# Patient Record
Sex: FEMALE | Race: WHITE | NOT HISPANIC OR LATINO
[De-identification: names, ages, dates, MRNs, and addresses within clinical notes are randomized per-mention and may not be internally consistent; named-entity substitution may affect disease eponyms.]

---

## 2017-03-07 ENCOUNTER — RX RENEWAL (OUTPATIENT)
Age: 77
End: 2017-03-07

## 2017-06-01 ENCOUNTER — APPOINTMENT (OUTPATIENT)
Dept: HEMATOLOGY ONCOLOGY | Facility: CLINIC | Age: 77
End: 2017-06-01

## 2017-06-01 VITALS
WEIGHT: 155 LBS | HEART RATE: 64 BPM | BODY MASS INDEX: 26.46 KG/M2 | TEMPERATURE: 98.4 F | DIASTOLIC BLOOD PRESSURE: 50 MMHG | SYSTOLIC BLOOD PRESSURE: 118 MMHG

## 2017-09-08 ENCOUNTER — RX RENEWAL (OUTPATIENT)
Age: 77
End: 2017-09-08

## 2017-12-01 LAB
BASOPHILS # BLD AUTO: 0.08 K/UL
BASOPHILS NFR BLD AUTO: 1 %
EOSINOPHIL # BLD AUTO: 0.2 K/UL
EOSINOPHIL NFR BLD AUTO: 2.5 %
HCT VFR BLD CALC: 37.7 %
HGB BLD-MCNC: 12.1 G/DL
IMM GRANULOCYTES NFR BLD AUTO: 0.3 %
LYMPHOCYTES # BLD AUTO: 1.53 K/UL
LYMPHOCYTES NFR BLD AUTO: 19.4 %
MAN DIFF?: NORMAL
MCHC RBC-ENTMCNC: 29.3 PG
MCHC RBC-ENTMCNC: 32.1 GM/DL
MCV RBC AUTO: 91.3 FL
MONOCYTES # BLD AUTO: 0.58 K/UL
MONOCYTES NFR BLD AUTO: 7.4 %
NEUTROPHILS # BLD AUTO: 5.47 K/UL
NEUTROPHILS NFR BLD AUTO: 69.4 %
PLATELET # BLD AUTO: 379 K/UL
RBC # BLD: 4.13 M/UL
RBC # FLD: 13.2 %
WBC # FLD AUTO: 7.88 K/UL

## 2017-12-02 LAB
ALBUMIN SERPL ELPH-MCNC: 4 G/DL
ALP BLD-CCNC: 76 U/L
ALT SERPL-CCNC: 4 U/L
ANION GAP SERPL CALC-SCNC: 15 MMOL/L
AST SERPL-CCNC: 21 U/L
BILIRUB SERPL-MCNC: 0.2 MG/DL
BUN SERPL-MCNC: 22 MG/DL
CALCIUM SERPL-MCNC: 10.1 MG/DL
CHLORIDE SERPL-SCNC: 106 MMOL/L
CO2 SERPL-SCNC: 23 MMOL/L
CREAT SERPL-MCNC: 1.21 MG/DL
GLUCOSE SERPL-MCNC: 111 MG/DL
POTASSIUM SERPL-SCNC: 4.6 MMOL/L
PROT SERPL-MCNC: 7 G/DL
SODIUM SERPL-SCNC: 144 MMOL/L

## 2017-12-11 ENCOUNTER — RECORD ABSTRACTING (OUTPATIENT)
Age: 77
End: 2017-12-11

## 2017-12-14 ENCOUNTER — APPOINTMENT (OUTPATIENT)
Dept: HEMATOLOGY ONCOLOGY | Facility: CLINIC | Age: 77
End: 2017-12-14
Payer: MEDICARE

## 2017-12-14 VITALS
BODY MASS INDEX: 26.46 KG/M2 | DIASTOLIC BLOOD PRESSURE: 56 MMHG | TEMPERATURE: 98.5 F | HEART RATE: 64 BPM | WEIGHT: 155 LBS | SYSTOLIC BLOOD PRESSURE: 106 MMHG

## 2017-12-14 DIAGNOSIS — Z00.00 ENCOUNTER FOR GENERAL ADULT MEDICAL EXAMINATION W/OUT ABNORMAL FINDINGS: ICD-10-CM

## 2017-12-14 DIAGNOSIS — Z86.73 PERSONAL HISTORY OF TRANSIENT ISCHEMIC ATTACK (TIA), AND CEREBRAL INFARCTION W/OUT RESIDUAL DEFICITS: ICD-10-CM

## 2017-12-14 PROCEDURE — 99214 OFFICE O/P EST MOD 30 MIN: CPT

## 2018-02-28 ENCOUNTER — RX RENEWAL (OUTPATIENT)
Age: 78
End: 2018-02-28

## 2018-06-28 ENCOUNTER — APPOINTMENT (OUTPATIENT)
Dept: HEMATOLOGY ONCOLOGY | Facility: CLINIC | Age: 78
End: 2018-06-28

## 2018-07-05 ENCOUNTER — APPOINTMENT (OUTPATIENT)
Dept: HEMATOLOGY ONCOLOGY | Facility: CLINIC | Age: 78
End: 2018-07-05
Payer: MEDICARE

## 2018-07-05 VITALS
BODY MASS INDEX: 25.61 KG/M2 | SYSTOLIC BLOOD PRESSURE: 124 MMHG | DIASTOLIC BLOOD PRESSURE: 60 MMHG | HEART RATE: 68 BPM | WEIGHT: 150 LBS | TEMPERATURE: 98.4 F

## 2018-07-05 PROCEDURE — 99214 OFFICE O/P EST MOD 30 MIN: CPT

## 2018-07-05 RX ORDER — LORAZEPAM 1 MG/1
1 TABLET ORAL
Qty: 1 | Refills: 0 | Status: COMPLETED | COMMUNITY
Start: 2018-03-05

## 2018-08-23 ENCOUNTER — RX RENEWAL (OUTPATIENT)
Age: 78
End: 2018-08-23

## 2018-12-21 ENCOUNTER — OUTPATIENT (OUTPATIENT)
Dept: OUTPATIENT SERVICES | Facility: HOSPITAL | Age: 78
LOS: 1 days | Discharge: HOME | End: 2018-12-21

## 2018-12-21 DIAGNOSIS — D64.9 ANEMIA, UNSPECIFIED: ICD-10-CM

## 2019-01-02 LAB
ALBUMIN SERPL ELPH-MCNC: 4.1 G/DL
ALP BLD-CCNC: 77 U/L
ALT SERPL-CCNC: 7 U/L
ANION GAP SERPL CALC-SCNC: 10 MMOL/L
AST SERPL-CCNC: 17 U/L
BASOPHILS # BLD AUTO: 0.07 K/UL
BASOPHILS NFR BLD AUTO: 1.3 %
BILIRUB SERPL-MCNC: 0.4 MG/DL
BUN SERPL-MCNC: 20 MG/DL
CALCIUM SERPL-MCNC: 9.9 MG/DL
CHLORIDE SERPL-SCNC: 108 MMOL/L
CO2 SERPL-SCNC: 27 MMOL/L
CREAT SERPL-MCNC: 1.08 MG/DL
EOSINOPHIL # BLD AUTO: 0.22 K/UL
EOSINOPHIL NFR BLD AUTO: 4 %
GLUCOSE SERPL-MCNC: 124 MG/DL
HCT VFR BLD CALC: 38.4 %
HGB BLD-MCNC: 12.2 G/DL
IMM GRANULOCYTES NFR BLD AUTO: 0.5 %
LYMPHOCYTES # BLD AUTO: 1.75 K/UL
LYMPHOCYTES NFR BLD AUTO: 31.9 %
MAN DIFF?: NORMAL
MCHC RBC-ENTMCNC: 28.4 PG
MCHC RBC-ENTMCNC: 31.8 GM/DL
MCV RBC AUTO: 89.3 FL
MONOCYTES # BLD AUTO: 0.44 K/UL
MONOCYTES NFR BLD AUTO: 8 %
NEUTROPHILS # BLD AUTO: 2.97 K/UL
NEUTROPHILS NFR BLD AUTO: 54.3 %
PLATELET # BLD AUTO: 431 K/UL
POTASSIUM SERPL-SCNC: 4.2 MMOL/L
PROT SERPL-MCNC: 6 G/DL
RBC # BLD: 4.3 M/UL
RBC # FLD: 13.4 %
SODIUM SERPL-SCNC: 145 MMOL/L
WBC # FLD AUTO: 5.48 K/UL

## 2019-01-08 ENCOUNTER — APPOINTMENT (OUTPATIENT)
Dept: HEMATOLOGY ONCOLOGY | Facility: CLINIC | Age: 79
End: 2019-01-08
Payer: MEDICARE

## 2019-01-08 ENCOUNTER — FORM ENCOUNTER (OUTPATIENT)
Age: 79
End: 2019-01-08

## 2019-01-08 VITALS
RESPIRATION RATE: 16 BRPM | OXYGEN SATURATION: 98 % | WEIGHT: 150.8 LBS | TEMPERATURE: 97.8 F | HEART RATE: 70 BPM | BODY MASS INDEX: 25.74 KG/M2 | DIASTOLIC BLOOD PRESSURE: 72 MMHG | SYSTOLIC BLOOD PRESSURE: 123 MMHG

## 2019-01-08 PROCEDURE — 99214 OFFICE O/P EST MOD 30 MIN: CPT

## 2019-01-08 NOTE — ASSESSMENT
[FreeTextEntry1] : H/O B/L breast cancer-clinically MARY. Patient consents to continue Arimidex (potential side effect profile reviewed).\par Suspect mild thrombocytosis reactive in nature (recent URI)-follow clinically for now. CBC to be done with next blood work.\par Patient's questions have been answered to her satisfaction at this time.

## 2019-01-08 NOTE — OB HISTORY
[Post-Menopause, No Sxs] : post-menopausal, currently without symptoms [Menopause Age: ____] : age at menopause was [unfilled] [___] : Total Pregnancies: [unfilled]

## 2019-01-08 NOTE — PHYSICAL EXAM
[Fully active, able to carry on all pre-disease performance without restriction] : Status 0 - Fully active, able to carry on all pre-disease performance without restriction [Normal] : full range of motion and no deformities appreciated [de-identified] : non-toxic appearing [de-identified] : Left breast without dominant mass or nipple discharge.  Right reconstructed breast without palpable abnormality.

## 2019-01-08 NOTE — HISTORY OF PRESENT ILLNESS
[Disease: _____________________] : Disease: [unfilled] [T: ___] : T[unfilled] [N: ___] : N[unfilled] [M: ___] : M[unfilled] [AJCC Stage: ____] : AJCC Stage: [unfilled] [de-identified] : History of right breast cancer 1988-status post right mastectomy with saline implant reconstruction.\par History of left breast cancer 7/2015-Stage IA, ER positive/[place] positive/HER-2 negative. Status post left breast lumpectomy/sentinel lymph node dissection-->RT--> Arimidex begun 10/2015.\par Oncotype recurrence score of 11, placing patient in low risk category.\par  [de-identified] : invasive ductal carcinoma [de-identified] : Note patient is on a lung cancer screening research protocol with [facility] for which she receives regular interval CT imaging of the chest.\par \par PCP:  Dr. Janny Ta-#[phone]\par Surgeon:  Dr. Trevin Coy-#[phone] [de-identified] : Had recent mild sinus congestion. Otherwise, overall feeling well without new complaints.\par No pulmonary/GI or  complaints at this time. No fevers. No abnormal bleeding. No new breast pain or lumps.\par Continues to play tennis regularly.\par \par \par

## 2019-01-08 NOTE — CONSULT LETTER
[Dear  ___] : Dear  [unfilled], [Courtesy Letter:] : I had the pleasure of seeing your patient, [unfilled], in my office today. [Please see my note below.] : Please see my note below. [Consult Closing:] : Thank you very much for allowing me to participate in the care of this patient.  If you have any questions, please do not hesitate to contact me. [Sincerely,] : Sincerely, [FreeTextEntry3] : Virginia Turcios M.D.

## 2019-01-08 NOTE — RESULTS/DATA
[FreeTextEntry1] : 10/2018-mammogram with no mammographic evidence of malignancy; breast US with no sonographic evidence of malignancy

## 2019-01-09 ENCOUNTER — APPOINTMENT (OUTPATIENT)
Dept: RADIOLOGY | Facility: HOSPITAL | Age: 79
End: 2019-01-09
Payer: MEDICARE

## 2019-01-09 ENCOUNTER — OUTPATIENT (OUTPATIENT)
Dept: OUTPATIENT SERVICES | Facility: HOSPITAL | Age: 79
LOS: 1 days | End: 2019-01-09
Payer: MEDICARE

## 2019-01-09 ENCOUNTER — MESSAGE (OUTPATIENT)
Age: 79
End: 2019-01-09

## 2019-01-09 DIAGNOSIS — Z00.8 ENCOUNTER FOR OTHER GENERAL EXAMINATION: ICD-10-CM

## 2019-01-09 PROCEDURE — 77080 DXA BONE DENSITY AXIAL: CPT

## 2019-01-09 PROCEDURE — 77080 DXA BONE DENSITY AXIAL: CPT | Mod: 26

## 2019-02-20 ENCOUNTER — MOBILE ON CALL (OUTPATIENT)
Age: 79
End: 2019-02-20

## 2019-07-01 ENCOUNTER — OUTPATIENT (OUTPATIENT)
Dept: OUTPATIENT SERVICES | Facility: HOSPITAL | Age: 79
LOS: 1 days | Discharge: HOME | End: 2019-07-01

## 2019-07-01 DIAGNOSIS — D64.9 ANEMIA, UNSPECIFIED: ICD-10-CM

## 2019-07-02 ENCOUNTER — APPOINTMENT (OUTPATIENT)
Dept: HEMATOLOGY ONCOLOGY | Facility: CLINIC | Age: 79
End: 2019-07-02
Payer: MEDICARE

## 2019-07-02 VITALS
BODY MASS INDEX: 25.22 KG/M2 | SYSTOLIC BLOOD PRESSURE: 149 MMHG | WEIGHT: 147.71 LBS | OXYGEN SATURATION: 100 % | RESPIRATION RATE: 16 BRPM | HEART RATE: 70 BPM | DIASTOLIC BLOOD PRESSURE: 67 MMHG | TEMPERATURE: 98.6 F

## 2019-07-02 DIAGNOSIS — M85.80 OTHER SPECIFIED DISORDERS OF BONE DENSITY AND STRUCTURE, UNSPECIFIED SITE: ICD-10-CM

## 2019-07-02 PROCEDURE — 99214 OFFICE O/P EST MOD 30 MIN: CPT

## 2019-07-02 NOTE — RESULTS/DATA
[FreeTextEntry1] : 1/2019-Bone densitometry-normal in the lumbar spine, and osteopenia in the right hip and right forearm.

## 2019-07-02 NOTE — ASSESSMENT
[FreeTextEntry1] : History of bilateral breast cancer-clinically MARY. Patient consents to continue Arimidex.\par Medications reviewed-patient remains on Plavix-potential bleeding risk reviewed.\par Discussed bone densitometry results with patient and treatment options. She declines any medication such as Prolia/Zometa. She is agreeable to continue calcium/vitamin D supplementation, and weight-bearing exercise.\par Patient's questions have been answered to her satisfaction at this time.

## 2019-07-02 NOTE — HISTORY OF PRESENT ILLNESS
[Disease: _____________________] : Disease: [unfilled] [T: ___] : T[unfilled] [N: ___] : N[unfilled] [AJCC Stage: ____] : AJCC Stage: [unfilled] [M: ___] : M[unfilled] [de-identified] : History of right breast cancer 1988-status post right mastectomy with saline implant reconstruction.\par History of left breast cancer 7/2015-Stage IA, ER positive/[place] positive/HER-2 negative. Status post left breast lumpectomy/sentinel lymph node dissection-->RT--> Arimidex begun 10/2015.\par Oncotype recurrence score of 11, placing patient in low risk category.\par  [de-identified] : invasive ductal carcinoma [de-identified] : Overall, feeling well without new complaints.\par Remains on Plavix-follows with neurologist every 2 years.\par No pulmonary/GI or  complaints at this time. No fevers. No new breast pain or lumps.\par Continues to play tennis regularly.\par \par \par  [de-identified] : Note patient is on a lung cancer screening research protocol with [facility] for which she receives regular interval CT imaging of the chest.\par \par PCP:  Dr. Janny Ta-#[phone]\par Surgeon: Was Dr. Trevin Coy-#[phone]

## 2019-07-02 NOTE — OB HISTORY
[Menopause Age: ____] : age at menopause was [unfilled] [Post-Menopause, No Sxs] : post-menopausal, currently without symptoms [___] : Total Pregnancies: [unfilled]

## 2019-07-02 NOTE — PHYSICAL EXAM
[Fully active, able to carry on all pre-disease performance without restriction] : Status 0 - Fully active, able to carry on all pre-disease performance without restriction [Normal] : grossly intact [de-identified] : non-toxic appearing [de-identified] : Left breast without dominant mass or nipple discharge.  Right reconstructed breast without palpable abnormality. [de-identified] : papery thin with a few scattered ecchymoses

## 2019-08-15 ENCOUNTER — RX RENEWAL (OUTPATIENT)
Age: 79
End: 2019-08-15

## 2020-01-02 ENCOUNTER — LABORATORY RESULT (OUTPATIENT)
Age: 80
End: 2020-01-02

## 2020-01-03 ENCOUNTER — OUTPATIENT (OUTPATIENT)
Dept: OUTPATIENT SERVICES | Facility: HOSPITAL | Age: 80
LOS: 1 days | Discharge: HOME | End: 2020-01-03

## 2020-01-03 DIAGNOSIS — D64.9 ANEMIA, UNSPECIFIED: ICD-10-CM

## 2020-01-06 ENCOUNTER — APPOINTMENT (OUTPATIENT)
Dept: HEMATOLOGY ONCOLOGY | Facility: CLINIC | Age: 80
End: 2020-01-06
Payer: MEDICARE

## 2020-01-06 VITALS
WEIGHT: 151.68 LBS | OXYGEN SATURATION: 100 % | RESPIRATION RATE: 16 BRPM | BODY MASS INDEX: 25.9 KG/M2 | SYSTOLIC BLOOD PRESSURE: 122 MMHG | TEMPERATURE: 97.9 F | HEART RATE: 81 BPM | DIASTOLIC BLOOD PRESSURE: 70 MMHG

## 2020-01-06 PROCEDURE — 99214 OFFICE O/P EST MOD 30 MIN: CPT

## 2020-01-06 RX ORDER — OLMESARTAN MEDOXOMIL 40 MG/1
40 TABLET, FILM COATED ORAL
Refills: 0 | Status: ACTIVE | COMMUNITY
Start: 2020-01-06

## 2020-01-06 NOTE — CONSULT LETTER
[Courtesy Letter:] : I had the pleasure of seeing your patient, [unfilled], in my office today. [Please see my note below.] : Please see my note below. [Consult Closing:] : Thank you very much for allowing me to participate in the care of this patient.  If you have any questions, please do not hesitate to contact me. [Sincerely,] : Sincerely, [Dear  ___] : Dear  [unfilled], [FreeTextEntry3] : Virginia Turcios M.D.

## 2020-01-06 NOTE — HISTORY OF PRESENT ILLNESS
[Disease: _____________________] : Disease: [unfilled] [T: ___] : T[unfilled] [M: ___] : M[unfilled] [N: ___] : N[unfilled] [AJCC Stage: ____] : AJCC Stage: [unfilled] [de-identified] : History of right breast cancer 1988-status post right mastectomy with saline implant reconstruction.\par History of left breast cancer 7/2015-Stage IA, ER positive/[place] positive/HER-2 negative. Status post left breast lumpectomy/sentinel lymph node dissection-->RT--> Arimidex begun 10/2015.\par Oncotype recurrence score of 11, placing patient in low risk category.\par  [de-identified] : invasive ductal carcinoma [de-identified] : Note patient is on a lung cancer screening research protocol with [facility] for which she receives regular interval CT imaging of the chest.\par \par PCP:  Dr. Janny Ta-#[phone]\par Surgeon: Was Dr. Trevin Coy-#[phone] [de-identified] : +Sinus congestion-gets sinus infections this time of year. No c/o H/A or fevers.\par Has second shingles vaccine and f/u scheduled next month with PCP Dr. Ta.\par BP medication changed recently-taking Olmesartan now.\par Remains on Plavix. No bleeding. \par No pulmonary/GI or  complaints at this time. No new breast pain or lumps.\par \par \par \par

## 2020-01-06 NOTE — PHYSICAL EXAM
[Fully active, able to carry on all pre-disease performance without restriction] : Status 0 - Fully active, able to carry on all pre-disease performance without restriction [Normal] : affect appropriate [de-identified] : non-toxic appearing [de-identified] : Left breast without dominant mass or nipple discharge.  Right reconstructed breast without palpable abnormality. [de-identified] : papery thin with a few scattered ecchymoses

## 2020-01-06 NOTE — ASSESSMENT
[FreeTextEntry1] : #1) history of bilateral breast cancer-clinically MARY. Patient consents to continue Arimidex.\par #2) thrombocytosis-? reactive process-patient reports history of sinusitis. Discussed differential diagnosis with her. Interval repeat CBC to be done. Should hematologic scenario change/worsen, further evaluation warranted to rule out evolving myeloproliferative disorder. Patient remains on Plavix-potential bleeding risk reviewed. No overt bleeding noted clinically at present.\par #3) increased creatinine-? medication related-note recent blood pressure medication change. Advised follow up with PCP regarding this issue-patient stated she has appointment with PCP next month and will discuss with her. A copy of lab work to be faxed to Dr. Ta.\par Patient was given the opportunity to ask questions. Her questions have been answered to her apparent satisfaction at this time. She expressed her understanding and willingness to comply with recommended followup.

## 2020-02-06 ENCOUNTER — RX RENEWAL (OUTPATIENT)
Age: 80
End: 2020-02-06

## 2020-05-26 ENCOUNTER — LABORATORY RESULT (OUTPATIENT)
Age: 80
End: 2020-05-26

## 2020-05-28 LAB
BASOPHILS # BLD AUTO: 0.1 K/UL
BASOPHILS NFR BLD AUTO: 1.6 %
CREAT SERPL-MCNC: 1.27 MG/DL
EOSINOPHIL # BLD AUTO: 0.21 K/UL
EOSINOPHIL NFR BLD AUTO: 3.3 %
FERRITIN SERPL-MCNC: 299 NG/ML
FOLATE SERPL-MCNC: 13.6 NG/ML
HAPTOGLOB SERPL-MCNC: 195 MG/DL
HCT VFR BLD CALC: 35.7 %
HGB BLD-MCNC: 11.2 G/DL
IMM GRANULOCYTES NFR BLD AUTO: 1.3 %
IRON SATN MFR SERPL: 35 %
IRON SERPL-MCNC: 100 UG/DL
LYMPHOCYTES # BLD AUTO: 1.44 K/UL
LYMPHOCYTES NFR BLD AUTO: 22.5 %
MAN DIFF?: NORMAL
MCHC RBC-ENTMCNC: 29 PG
MCHC RBC-ENTMCNC: 31.4 GM/DL
MCV RBC AUTO: 92.5 FL
MONOCYTES # BLD AUTO: 0.53 K/UL
MONOCYTES NFR BLD AUTO: 8.3 %
NEUTROPHILS # BLD AUTO: 4.04 K/UL
NEUTROPHILS NFR BLD AUTO: 63 %
PLATELET # BLD AUTO: 507 K/UL
RBC # BLD: 3.86 M/UL
RBC # BLD: 3.86 M/UL
RBC # FLD: 13.4 %
RETICS # AUTO: 1.6 %
RETICS AGGREG/RBC NFR: 60.2 K/UL
TIBC SERPL-MCNC: 284 UG/DL
UIBC SERPL-MCNC: 184 UG/DL
VIT B12 SERPL-MCNC: 275 PG/ML
WBC # FLD AUTO: 6.4 K/UL

## 2020-05-30 LAB
ALBUMIN MFR SERPL ELPH: 61.5 %
ALBUMIN SERPL-MCNC: 3.9 G/DL
ALBUMIN/GLOB SERPL: 1.6 RATIO
ALPHA1 GLOB MFR SERPL ELPH: 4.3 %
ALPHA1 GLOB SERPL ELPH-MCNC: 0.3 G/DL
ALPHA2 GLOB MFR SERPL ELPH: 11.6 %
ALPHA2 GLOB SERPL ELPH-MCNC: 0.7 G/DL
B-GLOBULIN MFR SERPL ELPH: 11.5 %
B-GLOBULIN SERPL ELPH-MCNC: 0.7 G/DL
GAMMA GLOB FLD ELPH-MCNC: 0.7 G/DL
GAMMA GLOB MFR SERPL ELPH: 11.1 %
INTERPRETATION SERPL IEP-IMP: NORMAL
PROT SERPL-MCNC: 6.3 G/DL
PROT SERPL-MCNC: 6.3 G/DL

## 2020-06-04 LAB
JAK2 P.V617F BLD/T QL: NEGATIVE
JAK2RLX: NEGATIVE

## 2020-06-05 ENCOUNTER — OUTPATIENT (OUTPATIENT)
Dept: OUTPATIENT SERVICES | Facility: HOSPITAL | Age: 80
LOS: 1 days | Discharge: HOME | End: 2020-06-05

## 2020-06-05 DIAGNOSIS — D64.9 ANEMIA, UNSPECIFIED: ICD-10-CM

## 2020-06-08 ENCOUNTER — APPOINTMENT (OUTPATIENT)
Dept: HEMATOLOGY ONCOLOGY | Facility: CLINIC | Age: 80
End: 2020-06-08
Payer: MEDICARE

## 2020-06-08 PROCEDURE — 99214 OFFICE O/P EST MOD 30 MIN: CPT | Mod: 95

## 2020-06-08 RX ORDER — CLOBETASOL PROPIONATE 0.5 MG/G
0.05 CREAM TOPICAL
Qty: 60 | Refills: 0 | Status: ACTIVE | COMMUNITY
Start: 2020-05-12

## 2020-06-08 NOTE — PHYSICAL EXAM
[Fully active, able to carry on all pre-disease performance without restriction] : Status 0 - Fully active, able to carry on all pre-disease performance without restriction [Normal] : affect appropriate [de-identified] : breathing appeared unlabored [de-identified] : coloration appeared normal

## 2020-06-08 NOTE — ASSESSMENT
[FreeTextEntry1] : #1) history of bilateral breast cancer-clinically stable. Patient consents to continue aromatase inhibitor therapy.\par #2) thrombocytosis/mild anemia-labwork reviewed with patient.? Chronic disease component to her anemia. She is to start taking PO vitamin B12 supplement for a vitamin B12 level <400. No overt bleeding reported currently.\par While there may be a reactive component to her thrombocytosis, as it persists, recommend further evaluation. Will do interval f/u lab work-pending this, and should thrombocytosis continue to progress, to consider bone marrow evaluation to further evaluate for evolving myeloproliferative disorder/bone marrow disorder-d/w patient today.\par Patient was given the opportunity to ask questions. Her questions have been answered to her apparent satisfaction at this time. She expressed her understanding and willingness to comply with recommended followup.

## 2020-06-08 NOTE — HISTORY OF PRESENT ILLNESS
[T: ___] : T[unfilled] [Disease: _____________________] : Disease: [unfilled] [N: ___] : N[unfilled] [M: ___] : M[unfilled] [AJCC Stage: ____] : AJCC Stage: [unfilled] [de-identified] : invasive ductal carcinoma [de-identified] : History of right breast cancer 1988-status post right mastectomy with saline implant reconstruction.\par History of left breast cancer 7/2015-Stage IA, ER positive/[place] positive/HER-2 negative. Status post left breast lumpectomy/sentinel lymph node dissection-->RT--> Arimidex begun 10/2015.\par Oncotype recurrence score of 11, placing patient in low risk category.\par  [de-identified] : Note patient is on a lung cancer screening research protocol with [facility] for which she receives regular interval CT imaging of the chest.\par \par PCP:  Dr. Janny Ta-#[phone]\par Surgeon: Was Dr. Trevin Coy-#[phone] [de-identified] : Telehealth/Doximity video visit due to COVID-19 pandemic.\par Saw dentist this past Sat., and BP was 133/71 and temp normal.\par Completed shingles vaccine series.\par Has allergies, though no recurrent sinusitis since last visit.\par Remains on Plavix. No bleeding. \par No pulmonary/GI or  complaints at this time. No new breast pain or lumps. No c/o H/A or pruritis.\par \par \par \par

## 2020-06-23 LAB
BASOPHILS # BLD AUTO: 0.14 K/UL
BASOPHILS NFR BLD AUTO: 1.8 %
EOSINOPHIL # BLD AUTO: 0.32 K/UL
EOSINOPHIL NFR BLD AUTO: 4.2 %
HCT VFR BLD CALC: 36.2 %
HCYS SERPL-MCNC: 16 UMOL/L
HGB BLD-MCNC: 11.3 G/DL
IMM GRANULOCYTES NFR BLD AUTO: 1 %
LYMPHOCYTES # BLD AUTO: 1.55 K/UL
LYMPHOCYTES NFR BLD AUTO: 20.3 %
MAN DIFF?: NORMAL
MCHC RBC-ENTMCNC: 29.2 PG
MCHC RBC-ENTMCNC: 31.2 GM/DL
MCV RBC AUTO: 93.5 FL
MONOCYTES # BLD AUTO: 0.67 K/UL
MONOCYTES NFR BLD AUTO: 8.8 %
NEUTROPHILS # BLD AUTO: 4.86 K/UL
NEUTROPHILS NFR BLD AUTO: 63.9 %
PLATELET # BLD AUTO: 483 K/UL
RBC # BLD: 3.87 M/UL
RBC # FLD: 13.6 %
WBC # FLD AUTO: 7.62 K/UL

## 2020-06-24 LAB — GENE XXX MUT ANL BLD/T: NORMAL

## 2020-06-26 LAB
METHYLMALONATE SERPL-SCNC: 312 NMOL/L
T(9;22)(ABL1,BCR)/CONTROL BLD/T: NORMAL

## 2020-06-29 LAB
AMINO ACID MPL: NORMAL
ASSAY DETAILS MPL: NORMAL
BLOCK/SPECIMEN ID: NORMAL
EXON MPL: NORMAL
GENE MPL: NORMAL
INTERPRETATION: NORMAL
Lab: NORMAL
MPL EXON 10 MUTATION: NOT DETECTED
MPL SPECIMEN SOURCE: NORMAL
MUTATION TYPE: NORMAL
MUTFREQ: NORMAL
NUCCHA: NORMAL
REFERENCE MPL: NORMAL

## 2020-06-30 ENCOUNTER — APPOINTMENT (OUTPATIENT)
Dept: HEMATOLOGY ONCOLOGY | Facility: CLINIC | Age: 80
End: 2020-06-30
Payer: MEDICARE

## 2020-06-30 PROCEDURE — 99214 OFFICE O/P EST MOD 30 MIN: CPT | Mod: 95

## 2020-06-30 NOTE — REASON FOR VISIT
[Medical Office: ([facility])___] : at the medical office located in  [Home] : at home, [unfilled] , at the time of the visit. [Verbal consent obtained from patient] : the patient, [unfilled] [Follow-Up Visit] : a follow-up [FreeTextEntry2] : thrombocytosis

## 2020-06-30 NOTE — HISTORY OF PRESENT ILLNESS
[Disease: _____________________] : Disease: [unfilled] [T: ___] : T[unfilled] [M: ___] : M[unfilled] [N: ___] : N[unfilled] [AJCC Stage: ____] : AJCC Stage: [unfilled] [de-identified] : History of right breast cancer 1988-status post right mastectomy with saline implant reconstruction.\par History of left breast cancer 7/2015-Stage IA, ER positive/[place] positive/HER-2 negative. 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 Janny Ta-#[phone]\par Surgeon: Was Dr. Trevin Coy-#[phone] [de-identified] : Telehealth/Doximity video visit due to COVID-19 pandemic.\par No pulmonary/GI or  complaints at this time. No new breast pain or lumps. No c/o H/A or pruritis. No fevers. No abnormal bruising or bleeding reported.\par Notes nephew with h/o thrombocytosis being monitored.\par \par \par \par

## 2020-06-30 NOTE — PHYSICAL EXAM
[Fully active, able to carry on all pre-disease performance without restriction] : Status 0 - Fully active, able to carry on all pre-disease performance without restriction [Normal] : PERRL, EOMI, no conjunctival infection, anicteric [de-identified] : breathing appeared unlabored [de-identified] : coloration appeared normal

## 2020-06-30 NOTE — ASSESSMENT
[FreeTextEntry1] : Thrombocytosis/anemia/elevated homocysteine level-lab work reviewed with patient. 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  Potential complications if underlying myeloproliferative disorder explained, and explained that there is treatment available if needed, pending course. \par Indication for bone marrow procedure, potential benefits and side effects discussed.  Patient consents to schedule bone marrow procedure.\par Patient will continue with p.o. folic acid supplementation for elevated homocysteine level, and continues with p.o. vitamin B12 supplementation for h/o a level of less than 400.\par Patient was given the opportunity to ask questions.  Her questions have been answered to her apparent satisfaction at this time. She is agreeable to recommended f/u.\par \par \par

## 2020-07-21 ENCOUNTER — OUTPATIENT (OUTPATIENT)
Dept: OUTPATIENT SERVICES | Facility: HOSPITAL | Age: 80
LOS: 1 days | Discharge: HOME | End: 2020-07-21

## 2020-07-21 ENCOUNTER — OUTPATIENT (OUTPATIENT)
Dept: OUTPATIENT SERVICES | Facility: HOSPITAL | Age: 80
LOS: 1 days | End: 2020-07-21
Payer: MEDICARE

## 2020-07-21 DIAGNOSIS — D64.9 ANEMIA, UNSPECIFIED: ICD-10-CM

## 2020-07-24 ENCOUNTER — RESULT REVIEW (OUTPATIENT)
Age: 80
End: 2020-07-24

## 2020-07-24 ENCOUNTER — APPOINTMENT (OUTPATIENT)
Dept: HEMATOLOGY ONCOLOGY | Facility: CLINIC | Age: 80
End: 2020-07-24
Payer: MEDICARE

## 2020-07-24 VITALS
SYSTOLIC BLOOD PRESSURE: 152 MMHG | RESPIRATION RATE: 17 BRPM | OXYGEN SATURATION: 99 % | BODY MASS INDEX: 26.84 KG/M2 | TEMPERATURE: 98 F | DIASTOLIC BLOOD PRESSURE: 76 MMHG | WEIGHT: 157.19 LBS | HEART RATE: 92 BPM

## 2020-07-24 LAB
BASOPHILS # BLD AUTO: 0.16 K/UL — SIGNIFICANT CHANGE UP (ref 0–0.2)
BASOPHILS NFR BLD AUTO: 1.7 % — SIGNIFICANT CHANGE UP (ref 0–2)
EOSINOPHIL # BLD AUTO: 0.46 K/UL — SIGNIFICANT CHANGE UP (ref 0–0.5)
EOSINOPHIL NFR BLD AUTO: 5 % — SIGNIFICANT CHANGE UP (ref 0–6)
HCT VFR BLD CALC: 36.2 % — SIGNIFICANT CHANGE UP (ref 34.5–45)
HGB BLD-MCNC: 12.2 G/DL — SIGNIFICANT CHANGE UP (ref 11.5–15.5)
IMM GRANULOCYTES NFR BLD AUTO: 1.6 % — HIGH (ref 0–1.5)
LYMPHOCYTES # BLD AUTO: 1.41 K/UL — SIGNIFICANT CHANGE UP (ref 1–3.3)
LYMPHOCYTES # BLD AUTO: 15.3 % — SIGNIFICANT CHANGE UP (ref 13–44)
MCHC RBC-ENTMCNC: 30.2 PG — SIGNIFICANT CHANGE UP (ref 27–34)
MCHC RBC-ENTMCNC: 33.7 GM/DL — SIGNIFICANT CHANGE UP (ref 32–36)
MCV RBC AUTO: 89.6 FL — SIGNIFICANT CHANGE UP (ref 80–100)
MONOCYTES # BLD AUTO: 0.81 K/UL — SIGNIFICANT CHANGE UP (ref 0–0.9)
MONOCYTES NFR BLD AUTO: 8.8 % — SIGNIFICANT CHANGE UP (ref 2–14)
NEUTROPHILS # BLD AUTO: 6.25 K/UL — SIGNIFICANT CHANGE UP (ref 1.8–7.4)
NEUTROPHILS NFR BLD AUTO: 67.6 % — SIGNIFICANT CHANGE UP (ref 43–77)
NRBC # BLD: 0 /100 WBCS — SIGNIFICANT CHANGE UP (ref 0–0)
PLATELET # BLD AUTO: 499 K/UL — HIGH (ref 150–400)
RBC # BLD: 4.04 M/UL — SIGNIFICANT CHANGE UP (ref 3.8–5.2)
RBC # FLD: 12.7 % — SIGNIFICANT CHANGE UP (ref 10.3–14.5)
WBC # BLD: 9.24 K/UL — SIGNIFICANT CHANGE UP (ref 3.8–10.5)
WBC # FLD AUTO: 9.24 K/UL — SIGNIFICANT CHANGE UP (ref 3.8–10.5)

## 2020-07-24 PROCEDURE — 88342 IMHCHEM/IMCYTCHM 1ST ANTB: CPT

## 2020-07-24 PROCEDURE — 88264 CHROMOSOME ANALYSIS 20-25: CPT

## 2020-07-24 PROCEDURE — 88342 IMHCHEM/IMCYTCHM 1ST ANTB: CPT | Mod: 26,59

## 2020-07-24 PROCEDURE — 88313 SPECIAL STAINS GROUP 2: CPT | Mod: 26

## 2020-07-24 PROCEDURE — 88189 FLOWCYTOMETRY/READ 16 & >: CPT

## 2020-07-24 PROCEDURE — 88280 CHROMOSOME KARYOTYPE STUDY: CPT

## 2020-07-24 PROCEDURE — 88185 FLOWCYTOMETRY/TC ADD-ON: CPT

## 2020-07-24 PROCEDURE — 38222 DX BONE MARROW BX & ASPIR: CPT | Mod: LT

## 2020-07-24 PROCEDURE — 88237 TISSUE CULTURE BONE MARROW: CPT

## 2020-07-24 PROCEDURE — 85097 BONE MARROW INTERPRETATION: CPT

## 2020-07-24 PROCEDURE — 88184 FLOWCYTOMETRY/ TC 1 MARKER: CPT

## 2020-07-24 PROCEDURE — 88271 CYTOGENETICS DNA PROBE: CPT

## 2020-07-24 PROCEDURE — 88275 CYTOGENETICS 100-300: CPT

## 2020-07-24 PROCEDURE — 88313 SPECIAL STAINS GROUP 2: CPT

## 2020-07-24 PROCEDURE — 88341 IMHCHEM/IMCYTCHM EA ADD ANTB: CPT | Mod: 26

## 2020-07-24 PROCEDURE — 88341 IMHCHEM/IMCYTCHM EA ADD ANTB: CPT

## 2020-07-24 PROCEDURE — 87205 SMEAR GRAM STAIN: CPT

## 2020-07-24 NOTE — PROCEDURE
[Bone Marrow Biopsy] : bone marrow biopsy [Bone Marrow Aspiration] : bone marrow aspiration  [Patient] : the patient [Patient identification verified] : patient identification verified [Verbal Consent Obtained] : verbal consent was obtained prior to the procedure [Laterality verified and correct site marked] : laterality verified and correct site marked [Procedure verified and consent obtained] : procedure verified and consent obtained [Correct positioning] : correct positioning [Left] : site: left [Prone] : prone [The left posterior iliac crest was prepped with betadine and draped, using sterile technique.] : The left posterior iliac crest was prepped with betadine and draped, using sterile technique. [Lidocaine was injected and into the periosteum overlying the site.] : Lidocaine was injected and into the periosteum overlying the site. [Superior iliac spine was identified] : the superior iliac spine was identified. 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 [] : The patient was instructed to remove the bandage the following AM. The patient may bathe. Acetaminophen may be taken for discomfort, as per package directions.If there are any other problems, the patient was instructed to call the office. The patient verbalized understanding, and is aware of the office contact numbers. [FreeTextEntry2] : CBC prior to procedure: \par WBC: 9.24\par Hgb: 12.2\par Hct: 36.2\par Plt: 499\par Bone marrow aspiration and biopsy were performed. No complications reported. Procedure completed with samples submitted as desired. \par Extra pressure applied to BMBx site > 20 minutes given patient is currently on Plavix. \par [employer] One purple top tube sent Track # [id number]\par \par

## 2020-07-24 NOTE — REASON FOR VISIT
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

## 2020-07-25 ENCOUNTER — RESULT REVIEW (OUTPATIENT)
Age: 80
End: 2020-07-25

## 2020-07-29 LAB — CHROM ANALY INTERPHASE BLD FISH-IMP: SIGNIFICANT CHANGE UP

## 2020-07-30 LAB
HEMATOPATHOLOGY REPORT: SIGNIFICANT CHANGE UP
HEMATOPATHOLOGY REPORT: SIGNIFICANT CHANGE UP
TM INTERPRETATION: SIGNIFICANT CHANGE UP

## 2020-07-31 ENCOUNTER — RX RENEWAL (OUTPATIENT)
Age: 80
End: 2020-07-31

## 2020-08-06 ENCOUNTER — RX RENEWAL (OUTPATIENT)
Age: 80
End: 2020-08-06

## 2020-08-06 ENCOUNTER — APPOINTMENT (OUTPATIENT)
Dept: HEMATOLOGY ONCOLOGY | Facility: CLINIC | Age: 80
End: 2020-08-06
Payer: MEDICARE

## 2020-08-06 PROCEDURE — 99214 OFFICE O/P EST MOD 30 MIN: CPT | Mod: 95

## 2020-08-06 NOTE — CONSULT LETTER
[Courtesy Letter:] : I had the pleasure of seeing your patient, [unfilled], in my office today. [Dear  ___] : Dear  [unfilled], [Please see my note below.] : Please see my note below. [Consult Closing:] : Thank you very much for allowing me to participate in the care of this patient.  If you have any questions, please do not hesitate to contact me. [Sincerely,] : Sincerely, [FreeTextEntry3] : Virginia Turcios MD

## 2020-08-06 NOTE — REASON FOR VISIT
[Home] : at home, [unfilled] , at the time of the visit. [Medical Office: ([facility])___] : at the medical office located in  [Verbal consent obtained from patient] : the patient, [unfilled] [Follow-Up Visit] : a follow-up [FreeTextEntry2] : thrombocytosis

## 2020-08-06 NOTE — HISTORY OF PRESENT ILLNESS
[Disease: _____________________] : Disease: [unfilled] [T: ___] : T[unfilled] [N: ___] : N[unfilled] [M: ___] : M[unfilled] [AJCC Stage: ____] : AJCC Stage: [unfilled] [de-identified] : invasive ductal carcinoma [de-identified] : History of right breast cancer 1988-status post right mastectomy with saline implant reconstruction.\par History of left breast cancer 7/2015-Stage IA, ER positive/[place] positive/HER-2 negative. 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 Bone marrow biopsy and bone marrow aspirate consistent with CALR-positive myeloproliferative neoplasm with main differential diagnosis including pre-fibrotic stage of primary MF and essential thrombocythemia.  Normal PDGFR FISH panel.\par  [de-identified] : Telehealth/Doximity video visit due to COVID-19 pandemic.\par No pulmonary/GI or  complaints at this time. No new breast pain or lumps. No c/o H/A or pruritis. No fevers. No abnormal bruising or bleeding reported.\par \par \par \par \par  [de-identified] : Note patient is on a lung cancer screening research protocol with [facility] for which she receives regular interval CT imaging of the chest.\par \par PCP:  Dr. Janny Ta-#[phone]\par Surgeon: Was Dr. Trevin Coy-#[phone]

## 2020-08-06 NOTE — PHYSICAL EXAM
[Fully active, able to carry on all pre-disease performance without restriction] : Status 0 - Fully active, able to carry on all pre-disease performance without restriction [Normal] : affect appropriate [de-identified] : breathing appeared unlabored [de-identified] : coloration appeared normal

## 2020-08-06 NOTE — ASSESSMENT
[FreeTextEntry1] : Thrombocytosis-reviewed bone marrow reports with patient in detail.  Consistent with CALR+ myeloproliferative neoplasm–differential diagnosis includes pre-fibrotic stage of primary myelofibrosis and essential thrombocythemia.  Cytogenetics pending (NGS not sent per hematopathology Dr. Dubon).\par Discussed potential complications of myeloproliferative neoplasms, treatment indications and potential treatment options.  Though age greater than 60, BONNIE 2 negative with no history of venous thrombosis, and patient is taking Plavix, so plan short interval follow-up lab work for now.  If progressive thrombocytosis, to consider cytoreductive therapy with for example Hydrea–potential side effects reviewed with patient.\par Patient will continue with p.o. folic acid supplementation for h/o elevated homocysteine level/anemia, and continues with p.o. vitamin B12 supplementation for h/o a level of less than 400.  Most recent hemoglobin within normal limits.\par Patient remains on aromatase inhibitor therapy for her breast cancer.\par Patient was given the opportunity to ask questions.  Her questions have been answered to her apparent satisfaction at this time. She is agreeable to recommended f/u, and concurs with plans of care.\par \par \par

## 2020-08-10 LAB — CHROM ANALY OVERALL INTERP SPEC-IMP: SIGNIFICANT CHANGE UP

## 2020-09-14 LAB
BASOPHILS # BLD AUTO: 0.12 K/UL
BASOPHILS NFR BLD AUTO: 1.5 %
EOSINOPHIL # BLD AUTO: 0.23 K/UL
EOSINOPHIL NFR BLD AUTO: 2.9 %
HCT VFR BLD CALC: 38.6 %
HGB BLD-MCNC: 11.9 G/DL
IMM GRANULOCYTES NFR BLD AUTO: 0.8 %
LYMPHOCYTES # BLD AUTO: 1.48 K/UL
LYMPHOCYTES NFR BLD AUTO: 19 %
MAN DIFF?: NORMAL
MCHC RBC-ENTMCNC: 28.9 PG
MCHC RBC-ENTMCNC: 30.8 GM/DL
MCV RBC AUTO: 93.7 FL
MONOCYTES # BLD AUTO: 0.56 K/UL
MONOCYTES NFR BLD AUTO: 7.2 %
NEUTROPHILS # BLD AUTO: 5.36 K/UL
NEUTROPHILS NFR BLD AUTO: 68.6 %
PLATELET # BLD AUTO: 522 K/UL
RBC # BLD: 4.12 M/UL
RBC # FLD: 13.2 %
WBC # FLD AUTO: 7.81 K/UL

## 2020-10-19 ENCOUNTER — NON-APPOINTMENT (OUTPATIENT)
Age: 80
End: 2020-10-19

## 2020-10-19 LAB
BASOPHILS # BLD AUTO: 0.13 K/UL
BASOPHILS NFR BLD AUTO: 1.7 %
EOSINOPHIL # BLD AUTO: 0.35 K/UL
EOSINOPHIL NFR BLD AUTO: 4.5 %
HCT VFR BLD CALC: 36 %
HGB BLD-MCNC: 11.3 G/DL
IMM GRANULOCYTES NFR BLD AUTO: 1.5 %
LYMPHOCYTES # BLD AUTO: 1.61 K/UL
LYMPHOCYTES NFR BLD AUTO: 20.5 %
MAN DIFF?: NORMAL
MCHC RBC-ENTMCNC: 29.3 PG
MCHC RBC-ENTMCNC: 31.4 GM/DL
MCV RBC AUTO: 93.3 FL
MONOCYTES # BLD AUTO: 0.59 K/UL
MONOCYTES NFR BLD AUTO: 7.5 %
NEUTROPHILS # BLD AUTO: 5.05 K/UL
NEUTROPHILS NFR BLD AUTO: 64.3 %
PLATELET # BLD AUTO: 490 K/UL
RBC # BLD: 3.86 M/UL
RBC # FLD: 13.3 %
WBC # FLD AUTO: 7.85 K/UL

## 2020-11-13 ENCOUNTER — OUTPATIENT (OUTPATIENT)
Dept: OUTPATIENT SERVICES | Facility: HOSPITAL | Age: 80
LOS: 1 days | Discharge: HOME | End: 2020-11-13

## 2020-11-13 DIAGNOSIS — D64.9 ANEMIA, UNSPECIFIED: ICD-10-CM

## 2020-11-19 ENCOUNTER — APPOINTMENT (OUTPATIENT)
Dept: HEMATOLOGY ONCOLOGY | Facility: CLINIC | Age: 80
End: 2020-11-19
Payer: MEDICARE

## 2020-11-19 VITALS
RESPIRATION RATE: 17 BRPM | HEART RATE: 75 BPM | SYSTOLIC BLOOD PRESSURE: 116 MMHG | HEIGHT: 63.23 IN | DIASTOLIC BLOOD PRESSURE: 71 MMHG | TEMPERATURE: 97.6 F | BODY MASS INDEX: 26.97 KG/M2 | OXYGEN SATURATION: 98 % | WEIGHT: 154.1 LBS

## 2020-11-19 PROCEDURE — 99214 OFFICE O/P EST MOD 30 MIN: CPT

## 2020-11-19 NOTE — ASSESSMENT
[FreeTextEntry1] : --Thrombocytosis-bone marrow consistent with CALR+ myeloproliferative neoplasm–differential diagnosis includes pre-fibrotic stage of primary myelofibrosis and essential thrombocythemia.  \par Though age greater than 60, BONNIE 2 negative with no history of venous thrombosis, and patient is taking Plavix, so plan surveillance of interval follow-up lab work for now.  If progressive thrombocytosis, to consider cytoreductive therapy with for example Hydrea.\par --Patient will continue with p.o. folic acid supplementation for h/o elevated homocysteine level/anemia, and continues with p.o. vitamin B12 supplementation for h/o a level of less than 400.  \par --Breast cancer-clinically MARY-patient remains on aromatase inhibitor therapy.\par Patient was given the opportunity to ask questions.  Her questions have been answered to her apparent satisfaction at this time. She is agreeable to recommended f/u, and concurs with plans of care.\par \par \par

## 2020-11-19 NOTE — REVIEW OF SYSTEMS
[Negative] : Allergic/Immunologic [Vomiting] : no vomiting [Constipation] : no constipation [Diarrhea] : no diarrhea

## 2020-11-19 NOTE — HISTORY OF PRESENT ILLNESS
[Disease: _____________________] : Disease: [unfilled] [T: ___] : T[unfilled] [N: ___] : N[unfilled] [M: ___] : M[unfilled] [AJCC Stage: ____] : AJCC Stage: [unfilled] [de-identified] : History of right breast cancer 1988-status post right mastectomy with saline implant reconstruction.\par History of left breast cancer 7/2015-Stage IA, ER positive/[place] positive/HER-2 negative. 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 Bone marrow biopsy and bone marrow aspirate consistent with CALR-positive myeloproliferative neoplasm with main differential diagnosis including pre-fibrotic stage of primary MF and essential thrombocythemia.  Normal PDGFR FISH panel. Cytogenetics with normal female karyotype.\par  [de-identified] : invasive ductal carcinoma [de-identified] : Note patient is on a lung cancer screening research protocol with  for which she receives regular interval CT imaging of the chest.\par \par PCP:  Dr. Janny Ta-#[phone]\par Surgeon: Was Dr. Trevin Coy-#[phone] [de-identified] : No c/o CP, SOB, fevers, cough.\par No new breast pain or lumps. No c/o H/A or pruritis. No abnormal bruising or bleeding reported.\par \par \par \par \par

## 2020-11-19 NOTE — PHYSICAL EXAM
[Fully active, able to carry on all pre-disease performance without restriction] : Status 0 - Fully active, able to carry on all pre-disease performance without restriction [Normal] : affect appropriate [de-identified] : no dominant mass

## 2020-11-19 NOTE — RESULTS/DATA
[FreeTextEntry1] : 10/2020-mammogram/breast US-no mammographic or sonographic evidence of malignancy

## 2020-12-08 ENCOUNTER — APPOINTMENT (OUTPATIENT)
Dept: HEMATOLOGY ONCOLOGY | Facility: CLINIC | Age: 80
End: 2020-12-08
Payer: MEDICARE

## 2020-12-08 VITALS
TEMPERATURE: 96.8 F | BODY MASS INDEX: 27.55 KG/M2 | WEIGHT: 157.41 LBS | DIASTOLIC BLOOD PRESSURE: 78 MMHG | SYSTOLIC BLOOD PRESSURE: 131 MMHG | HEIGHT: 63.23 IN | OXYGEN SATURATION: 97 % | HEART RATE: 80 BPM | RESPIRATION RATE: 17 BRPM

## 2020-12-08 PROCEDURE — 99214 OFFICE O/P EST MOD 30 MIN: CPT

## 2020-12-08 NOTE — ASSESSMENT
[FreeTextEntry1] : #1) Thrombocytosis-prior bone marrow consistent with CALR+ myeloproliferative neoplasm–differential diagnosis includes pre-fibrotic stage of primary myelofibrosis and essential thrombocythemia.  \par Though age greater than 60, BONNIE 2 negative with no history of venous thrombosis, and patient is taking Plavix, so plan surveillance of interval follow-up lab work for now.  If progressive thrombocytosis, to reconsider cytoreductive therapy with for example Hydrea.\par #2) anemia-patient will continue with p.o. folic acid supplementation for h/o elevated homocysteine level, and continues with p.o. vitamin B12 supplementation for h/o a level of less than 400.  \par #3) h/o right breast cancer and more recent left breast cancer-clinically MARY-patient consents to remain on aromatase inhibitor therapy as extended adjuvant therapy.\par Patient was given the opportunity to ask questions.  Her questions have been answered to her apparent satisfaction at this time. She is agreeable to recommended f/u, and concurs with plans of care.\par \par \par

## 2020-12-08 NOTE — PHYSICAL EXAM
[Fully active, able to carry on all pre-disease performance without restriction] : Status 0 - Fully active, able to carry on all pre-disease performance without restriction [Normal] : affect appropriate [de-identified] : no dominant mass

## 2020-12-08 NOTE — HISTORY OF PRESENT ILLNESS
[Disease: _____________________] : Disease: [unfilled] [T: ___] : T[unfilled] [N: ___] : N[unfilled] [M: ___] : M[unfilled] [AJCC Stage: ____] : AJCC Stage: [unfilled] [de-identified] : History of right breast cancer 1988-status post right mastectomy with saline implant reconstruction.\par History of left breast cancer 7/2015-Stage IA, ER positive/[place] positive/HER-2 negative. 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 Bone marrow biopsy and bone marrow aspirate consistent with CALR-positive myeloproliferative neoplasm with main differential diagnosis including pre-fibrotic stage of primary MF and essential thrombocythemia.  Normal PDGFR FISH panel. Cytogenetics with normal female karyotype.\par  [de-identified] : invasive ductal carcinoma [de-identified] : Note patient is on a lung cancer screening research protocol with [facility] for which she receives regular interval CT imaging of the chest.\par \par PCP:  Dr. Janny Ta-#[phone]\par Surgeon: Was Dr. Trevin Coy-#[phone] [de-identified] : No new complaints. No c/o CP, SOB, fevers, cough.\par No new breast pain or lumps. No c/o H/A or pruritis. No abnormal bruising or bleeding reported.\par BDT scheduled 1/2021.\par \par \par \par \par

## 2021-01-03 ENCOUNTER — RX RENEWAL (OUTPATIENT)
Age: 81
End: 2021-01-03

## 2021-01-04 LAB
BASOPHILS # BLD AUTO: 0.11 K/UL
BASOPHILS NFR BLD AUTO: 1.7 %
EOSINOPHIL # BLD AUTO: 0.28 K/UL
EOSINOPHIL NFR BLD AUTO: 4.3 %
HCT VFR BLD CALC: 36.7 %
HGB BLD-MCNC: 11.5 G/DL
IMM GRANULOCYTES NFR BLD AUTO: 0.9 %
LYMPHOCYTES # BLD AUTO: 1.47 K/UL
LYMPHOCYTES NFR BLD AUTO: 22.5 %
MAN DIFF?: NORMAL
MCHC RBC-ENTMCNC: 29.3 PG
MCHC RBC-ENTMCNC: 31.3 GM/DL
MCV RBC AUTO: 93.4 FL
MONOCYTES # BLD AUTO: 0.63 K/UL
MONOCYTES NFR BLD AUTO: 9.7 %
NEUTROPHILS # BLD AUTO: 3.97 K/UL
NEUTROPHILS NFR BLD AUTO: 60.9 %
PLATELET # BLD AUTO: 511 K/UL
RBC # BLD: 3.93 M/UL
RBC # FLD: 13.4 %
WBC # FLD AUTO: 6.52 K/UL

## 2021-01-11 ENCOUNTER — OUTPATIENT (OUTPATIENT)
Dept: OUTPATIENT SERVICES | Facility: HOSPITAL | Age: 81
LOS: 1 days | End: 2021-01-11
Payer: MEDICARE

## 2021-01-11 ENCOUNTER — RESULT REVIEW (OUTPATIENT)
Age: 81
End: 2021-01-11

## 2021-01-11 ENCOUNTER — APPOINTMENT (OUTPATIENT)
Dept: RADIOLOGY | Facility: HOSPITAL | Age: 81
End: 2021-01-11
Payer: MEDICARE

## 2021-01-11 DIAGNOSIS — D47.3 ESSENTIAL (HEMORRHAGIC) THROMBOCYTHEMIA: ICD-10-CM

## 2021-01-11 PROCEDURE — 77080 DXA BONE DENSITY AXIAL: CPT | Mod: 26

## 2021-01-11 PROCEDURE — 77080 DXA BONE DENSITY AXIAL: CPT

## 2021-01-25 ENCOUNTER — RX RENEWAL (OUTPATIENT)
Age: 81
End: 2021-01-25

## 2021-03-08 LAB
ALBUMIN SERPL ELPH-MCNC: 4.4 G/DL
ALP BLD-CCNC: 69 U/L
ALT SERPL-CCNC: 9 U/L
ANION GAP SERPL CALC-SCNC: 9 MMOL/L
AST SERPL-CCNC: 16 U/L
BASOPHILS # BLD AUTO: 0.14 K/UL
BASOPHILS NFR BLD AUTO: 1.9 %
BILIRUB SERPL-MCNC: 0.2 MG/DL
BUN SERPL-MCNC: 28 MG/DL
CALCIUM SERPL-MCNC: 10 MG/DL
CHLORIDE SERPL-SCNC: 107 MMOL/L
CO2 SERPL-SCNC: 27 MMOL/L
CREAT SERPL-MCNC: 1.22 MG/DL
EOSINOPHIL # BLD AUTO: 0.29 K/UL
EOSINOPHIL NFR BLD AUTO: 4 %
GLUCOSE SERPL-MCNC: 116 MG/DL
HCT VFR BLD CALC: 37.6 %
HGB BLD-MCNC: 11.8 G/DL
IMM GRANULOCYTES NFR BLD AUTO: 1.4 %
LYMPHOCYTES # BLD AUTO: 1.69 K/UL
LYMPHOCYTES NFR BLD AUTO: 23.5 %
MAN DIFF?: NORMAL
MCHC RBC-ENTMCNC: 29.5 PG
MCHC RBC-ENTMCNC: 31.4 GM/DL
MCV RBC AUTO: 94 FL
MONOCYTES # BLD AUTO: 0.62 K/UL
MONOCYTES NFR BLD AUTO: 8.6 %
NEUTROPHILS # BLD AUTO: 4.36 K/UL
NEUTROPHILS NFR BLD AUTO: 60.6 %
PLATELET # BLD AUTO: 560 K/UL
POTASSIUM SERPL-SCNC: 5 MMOL/L
PROT SERPL-MCNC: 6.5 G/DL
RBC # BLD: 4 M/UL
RBC # FLD: 13.2 %
SODIUM SERPL-SCNC: 144 MMOL/L
WBC # FLD AUTO: 7.2 K/UL

## 2021-03-12 DIAGNOSIS — Z92.89 PERSONAL HISTORY OF OTHER MEDICAL TREATMENT: ICD-10-CM

## 2021-03-15 ENCOUNTER — OUTPATIENT (OUTPATIENT)
Dept: OUTPATIENT SERVICES | Facility: HOSPITAL | Age: 81
LOS: 1 days | Discharge: HOME | End: 2021-03-15

## 2021-03-15 DIAGNOSIS — D64.9 ANEMIA, UNSPECIFIED: ICD-10-CM

## 2021-03-18 ENCOUNTER — APPOINTMENT (OUTPATIENT)
Dept: HEMATOLOGY ONCOLOGY | Facility: CLINIC | Age: 81
End: 2021-03-18
Payer: MEDICARE

## 2021-03-18 DIAGNOSIS — M81.0 AGE-RELATED OSTEOPOROSIS W/OUT CURRENT PATHOLOGICAL FRACTURE: ICD-10-CM

## 2021-03-18 PROCEDURE — 99214 OFFICE O/P EST MOD 30 MIN: CPT | Mod: 95

## 2021-03-18 NOTE — HISTORY OF PRESENT ILLNESS
[Disease: _____________________] : Disease: [unfilled] [T: ___] : T[unfilled] [N: ___] : N[unfilled] [M: ___] : M[unfilled] [AJCC Stage: ____] : AJCC Stage: [unfilled] [de-identified] : History of right breast cancer 1988-status post right mastectomy with saline implant reconstruction.\par History of left breast cancer 7/2015-Stage IA, ER positive/[place] positive/HER-2 negative. 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 Bone marrow biopsy and bone marrow aspirate consistent with CALR-positive myeloproliferative neoplasm with main differential diagnosis including pre-fibrotic stage of primary MF and essential thrombocythemia.  Normal PDGFR FISH panel. Cytogenetics with normal female karyotype.\par  [de-identified] : invasive ductal carcinoma [de-identified] : Note patient is on a lung cancer screening research protocol with [facility] for which she receives regular interval CT imaging of the chest.\par \par PCP:  Dr. Janny Ta-#[phone]\par Surgeon: Was Dr. Trevin Coy-#[phone] [de-identified] : Saw Dr. Ta in PCP f/u yesterday-no new medications.\par Had a reaction to second COVID vaccine (2/19/21)-had chills, abdominal pain, right hip pain, malaise-now recovered.\par No c/o CP, SOB, fevers, cough.\par No new breast pain or lumps. No c/o H/A or pruritis. No abnormal bruising or bleeding reported.\par \par \par \par \par \par

## 2021-03-18 NOTE — PHYSICAL EXAM
[Fully active, able to carry on all pre-disease performance without restriction] : Status 0 - Fully active, able to carry on all pre-disease performance without restriction [Normal] : affect appropriate [de-identified] : breathing pattern appeared unlabored [de-identified] : coloration appeared normal

## 2021-03-18 NOTE — ASSESSMENT
[FreeTextEntry1] : Lab work, bone density results reviewed.\par #1) Thrombocytosis-prior bone marrow consistent with CALR+ myeloproliferative neoplasm–differential diagnosis includes pre-fibrotic stage of primary myelofibrosis and essential thrombocythemia.  \par Though age greater than 58, BONNIE 2 negative with no history of venous thrombosis, and patient is taking Plavix, so plan surveillance of interval follow-up lab work for now.  However, increasing platelet trend d/w patient-if continued rise in platelet count--> to reconsider cytoreductive therapy with for example Hydrea.\par \par #2) anemia-patient will continue with p.o. folic acid supplementation for h/o elevated homocysteine level, and continues with p.o. vitamin B12 supplementation for h/o a level of less than 400.  Hemoglobin currently WNL.\par \par #3) h/o right breast cancer and more recent left breast cancer-clinically stable-patient consents to remain on aromatase inhibitor therapy as extended adjuvant therapy. Osteoporosis right forearm discussed, along with treatment options in the setting of breast cancer  (example, Prolia/Zometa with potential side effect profile reviewed). At this time, patient is agreeable to calcium/vitamin D supplementation and weight bearing exercise. Continue to monitor BDT.\par \par Patient was given the opportunity to ask questions.  Her questions have been answered to her apparent satisfaction at this time. She is agreeable to recommended f/u, and concurs with plans of care.\par \par \par

## 2021-03-18 NOTE — REASON FOR VISIT
[Home] : at home, [unfilled] , at the time of the visit. [Medical Office: ([facility])___] : at the medical office located in  [Verbal consent obtained from patient] : the patient, [unfilled] [Follow-Up Visit] : a follow-up [FreeTextEntry2] : MPD, breast cancer

## 2021-03-18 NOTE — CONSULT LETTER
[Dear  ___] : Dear  [unfilled], [Courtesy Letter:] : I had the pleasure of seeing your patient, [unfilled], in my office today. [Please see my note below.] : Please see my note below. [Consult Closing:] : Thank you very much for allowing me to participate in the care of this patient.  If you have any questions, please do not hesitate to contact me. [Sincerely,] : Sincerely, [FreeTextEntry3] : Virginia Turcios MD

## 2021-07-01 ENCOUNTER — RX RENEWAL (OUTPATIENT)
Age: 81
End: 2021-07-01

## 2021-07-06 LAB
ALBUMIN SERPL ELPH-MCNC: 4.3 G/DL
ALP BLD-CCNC: 71 U/L
ALT SERPL-CCNC: 10 U/L
ANION GAP SERPL CALC-SCNC: 13 MMOL/L
AST SERPL-CCNC: 13 U/L
BILIRUB SERPL-MCNC: 0.3 MG/DL
BUN SERPL-MCNC: 38 MG/DL
CALCIUM SERPL-MCNC: 10.2 MG/DL
CANCER AG27-29 SERPL-ACNC: 18.9 U/ML
CHLORIDE SERPL-SCNC: 108 MMOL/L
CO2 SERPL-SCNC: 24 MMOL/L
CREAT SERPL-MCNC: 1.29 MG/DL
FOLATE SERPL-MCNC: >20 NG/ML
GLUCOSE SERPL-MCNC: 108 MG/DL
POTASSIUM SERPL-SCNC: 5 MMOL/L
PROT SERPL-MCNC: 6.3 G/DL
SODIUM SERPL-SCNC: 145 MMOL/L
VIT B12 SERPL-MCNC: 1069 PG/ML

## 2021-07-07 ENCOUNTER — OUTPATIENT (OUTPATIENT)
Dept: OUTPATIENT SERVICES | Facility: HOSPITAL | Age: 81
LOS: 1 days | Discharge: HOME | End: 2021-07-07

## 2021-07-07 DIAGNOSIS — D64.9 ANEMIA, UNSPECIFIED: ICD-10-CM

## 2021-07-08 ENCOUNTER — APPOINTMENT (OUTPATIENT)
Dept: HEMATOLOGY ONCOLOGY | Facility: CLINIC | Age: 81
End: 2021-07-08
Payer: MEDICARE

## 2021-07-08 VITALS
RESPIRATION RATE: 16 BRPM | OXYGEN SATURATION: 99 % | BODY MASS INDEX: 27.12 KG/M2 | SYSTOLIC BLOOD PRESSURE: 122 MMHG | TEMPERATURE: 97.4 F | HEIGHT: 63.23 IN | WEIGHT: 154.98 LBS | DIASTOLIC BLOOD PRESSURE: 73 MMHG | HEART RATE: 77 BPM

## 2021-07-08 PROCEDURE — 99214 OFFICE O/P EST MOD 30 MIN: CPT

## 2021-07-08 NOTE — ASSESSMENT
[FreeTextEntry1] : Lab work reviewed.\par #1) Thrombocytosis-prior bone marrow consistent with CALR+ myeloproliferative neoplasm–differential diagnosis includes pre-fibrotic stage of primary myelofibrosis and essential thrombocythemia.  \par Though age greater than 58, BONNIE 2 negative with no history of venous thrombosis, and patient is taking Plavix, so plan surveillance of interval follow-up lab work for now.  If continued, persistent rise in platelet count--> to reconsider cytoreductive therapy with for example Hydrea.\par \par #2) anemia-patient will continue with p.o. folic acid supplementation for h/o elevated homocysteine level, and continues with p.o. vitamin B12 supplementation for h/o a level of less than 400.  Hemoglobin currently WNL.\par \par #3) h/o right breast cancer and more recent left breast cancer-clinically stable-patient consents to remain on aromatase inhibitor therapy as extended adjuvant therapy.\par \par Patient was given the opportunity to ask questions.  Her questions have been answered to her apparent satisfaction at this time. She is agreeable to recommended f/u, and concurs with plans of care.\par \par \par

## 2021-07-08 NOTE — HISTORY OF PRESENT ILLNESS
[Disease: _____________________] : Disease: [unfilled] [T: ___] : T[unfilled] [N: ___] : N[unfilled] [M: ___] : M[unfilled] [AJCC Stage: ____] : AJCC Stage: [unfilled] [de-identified] : History of right breast cancer 1988-status post right mastectomy with saline implant reconstruction.\par History of left breast cancer 7/2015-Stage IA, ER positive/[place] positive/HER-2 negative. 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 Bone marrow biopsy and bone marrow aspirate consistent with CALR-positive myeloproliferative neoplasm with main differential diagnosis including pre-fibrotic stage of primary MF and essential thrombocythemia.  Normal PDGFR FISH panel. Cytogenetics with normal female karyotype.\par  [de-identified] : invasive ductal carcinoma [de-identified] : Note patient is on a lung cancer screening research protocol with [facility] for which she receives regular interval CT imaging of the chest.\par \par PCP:  Dr. Janny Ta-#[phone]\par Surgeon: Was Dr. Trevin Coy-#[phone] [de-identified] : S/P ETT-told was stable.\par No c/o CP, SOB, fevers, cough.\par No new breast pain or lumps. No c/o H/A or pruritis. No abnormal bruising or bleeding reported.\par \par Got COVID vaccines (2/2021)-Pfizer.\par \par \par

## 2021-07-09 LAB
HOMOCYSTEINE LEVEL: 11.5 UMOL/L
METHYLMALONIC ACID LEVEL: 195 NMOL/L

## 2021-07-19 ENCOUNTER — RX RENEWAL (OUTPATIENT)
Age: 81
End: 2021-07-19

## 2021-09-01 ENCOUNTER — NON-APPOINTMENT (OUTPATIENT)
Age: 81
End: 2021-09-01

## 2021-09-01 LAB
BASOPHILS # BLD AUTO: 0.13 K/UL
BASOPHILS NFR BLD AUTO: 1.5 %
EOSINOPHIL # BLD AUTO: 0.49 K/UL
EOSINOPHIL NFR BLD AUTO: 5.6 %
HCT VFR BLD CALC: 36.2 %
HGB BLD-MCNC: 11.5 G/DL
IMM GRANULOCYTES NFR BLD AUTO: 0.9 %
LYMPHOCYTES # BLD AUTO: 1.5 K/UL
LYMPHOCYTES NFR BLD AUTO: 17.1 %
MAN DIFF?: NORMAL
MCHC RBC-ENTMCNC: 29.2 PG
MCHC RBC-ENTMCNC: 31.8 GM/DL
MCV RBC AUTO: 91.9 FL
MONOCYTES # BLD AUTO: 0.85 K/UL
MONOCYTES NFR BLD AUTO: 9.7 %
NEUTROPHILS # BLD AUTO: 5.71 K/UL
NEUTROPHILS NFR BLD AUTO: 65.2 %
PLATELET # BLD AUTO: 617 K/UL
RBC # BLD: 3.94 M/UL
RBC # FLD: 13.2 %
WBC # FLD AUTO: 8.76 K/UL

## 2021-11-02 ENCOUNTER — NON-APPOINTMENT (OUTPATIENT)
Age: 81
End: 2021-11-02

## 2021-11-02 LAB
ALBUMIN SERPL ELPH-MCNC: 4.3 G/DL
ALP BLD-CCNC: 69 U/L
ALT SERPL-CCNC: 12 U/L
AST SERPL-CCNC: 13 U/L
BASOPHILS # BLD AUTO: 0.11 K/UL
BASOPHILS NFR BLD AUTO: 1.5 %
BILIRUB DIRECT SERPL-MCNC: 0.1 MG/DL
BILIRUB INDIRECT SERPL-MCNC: 0.2 MG/DL
BILIRUB SERPL-MCNC: 0.2 MG/DL
CREAT SERPL-MCNC: 1.56 MG/DL
EOSINOPHIL # BLD AUTO: 0.23 K/UL
EOSINOPHIL NFR BLD AUTO: 3 %
HCT VFR BLD CALC: 35 %
HGB BLD-MCNC: 11.1 G/DL
IMM GRANULOCYTES NFR BLD AUTO: 1.1 %
LYMPHOCYTES # BLD AUTO: 1.47 K/UL
LYMPHOCYTES NFR BLD AUTO: 19.4 %
MAN DIFF?: NORMAL
MCHC RBC-ENTMCNC: 29.4 PG
MCHC RBC-ENTMCNC: 31.7 GM/DL
MCV RBC AUTO: 92.6 FL
MONOCYTES # BLD AUTO: 0.66 K/UL
MONOCYTES NFR BLD AUTO: 8.7 %
NEUTROPHILS # BLD AUTO: 5.03 K/UL
NEUTROPHILS NFR BLD AUTO: 66.3 %
PLATELET # BLD AUTO: 577 K/UL
PROT SERPL-MCNC: 6.4 G/DL
RBC # BLD: 3.78 M/UL
RBC # FLD: 13.4 %
WBC # FLD AUTO: 7.58 K/UL

## 2021-11-03 ENCOUNTER — NON-APPOINTMENT (OUTPATIENT)
Age: 81
End: 2021-11-03

## 2021-11-16 ENCOUNTER — OUTPATIENT (OUTPATIENT)
Dept: OUTPATIENT SERVICES | Facility: HOSPITAL | Age: 81
LOS: 1 days | Discharge: HOME | End: 2021-11-16

## 2021-11-16 DIAGNOSIS — D64.9 ANEMIA, UNSPECIFIED: ICD-10-CM

## 2021-11-18 ENCOUNTER — APPOINTMENT (OUTPATIENT)
Dept: HEMATOLOGY ONCOLOGY | Facility: CLINIC | Age: 81
End: 2021-11-18
Payer: MEDICARE

## 2021-11-18 ENCOUNTER — NON-APPOINTMENT (OUTPATIENT)
Age: 81
End: 2021-11-18

## 2021-11-18 VITALS
TEMPERATURE: 97.3 F | HEIGHT: 63.23 IN | SYSTOLIC BLOOD PRESSURE: 114 MMHG | HEART RATE: 80 BPM | BODY MASS INDEX: 27.2 KG/M2 | OXYGEN SATURATION: 99 % | WEIGHT: 155.43 LBS | DIASTOLIC BLOOD PRESSURE: 71 MMHG | RESPIRATION RATE: 17 BRPM

## 2021-11-18 PROCEDURE — 99214 OFFICE O/P EST MOD 30 MIN: CPT

## 2021-11-18 NOTE — ASSESSMENT
[FreeTextEntry1] : Lab work, mammogram/breast US results reviewed.\par #1) Thrombocytosis-6/2020 bone marrow consistent with CALR+ myeloproliferative neoplasm–differential diagnosis includes pre-fibrotic stage of primary myelofibrosis and essential thrombocythemia.  \par Though age greater than 58, BONNIE 2 negative with no history of venous thrombosis, and patient is taking Plavix, so patient has wished for surveillance of interval follow-up lab work for now (and no Hydrea).  If consistent, persistent rise in platelet count--> to reconsider cytoreductive therapy. Platelet count currently appears to be fluctuating.\par \par #2) anemia-patient will continue with p.o. folic acid supplementation for h/o elevated homocysteine level, and continues with p.o. vitamin B12 supplementation for h/o a level of less than 400. Increased creatinine discussed-may contribute to anemia as well. F/U with PCP for renal insufficiency.\par \par #3) h/o right breast cancer and more recent left breast cancer-clinically MARY-patient consents to remain on aromatase inhibitor therapy as extended adjuvant therapy. \par \par Patient was given the opportunity to ask questions.  Her questions have been answered to her apparent satisfaction at this time. She is agreeable to recommended f/u, and concurs with plans of care.\par \par \par

## 2021-11-18 NOTE — HISTORY OF PRESENT ILLNESS
[de-identified] : History of right breast cancer 1988-status post right mastectomy with saline implant reconstruction.\par History of left breast cancer 7/2015-Stage IA, ER positive/[place] positive/HER-2 negative. 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 Bone marrow biopsy and bone marrow aspirate consistent with CALR-positive myeloproliferative neoplasm with main differential diagnosis including pre-fibrotic stage of primary MF and essential thrombocythemia.  Normal PDGFR FISH panel. Cytogenetics with normal female karyotype.\par  [de-identified] : invasive ductal carcinoma [de-identified] : Note patient is on a lung cancer screening research protocol with [facility] for which she receives regular interval CT imaging of the chest.\par \par PCP:  Dr. Janny Ta-#[phone]\par Surgeon: Was Dr. Trevin Coy-#[phone] [de-identified] : No c/o CP, SOB, fevers, cough.\par No new breast pain or lumps. No c/o H/A or pruritis. No abnormal bruising or bleeding reported.\par Had mammogram last month-[facility] in [place] (Dr. Marcus)-told unremarkable.\par Got COVID vaccines (2/2021)-Pfizer. Had third shot as well.\par Had flu shot.\par \par \par

## 2021-11-18 NOTE — RESULTS/DATA
[FreeTextEntry1] : 11/2/2021–left mammogram–no suspicious findings.  Left breast ultrasound with benign findings.

## 2021-11-18 NOTE — PHYSICAL EXAM
[de-identified] : left breast without dominant mass or nipple discharge; right reconstructed breast without palpable abnormality

## 2022-01-03 ENCOUNTER — NON-APPOINTMENT (OUTPATIENT)
Age: 82
End: 2022-01-03

## 2022-01-03 LAB
BASOPHILS # BLD AUTO: 0.13 K/UL
BASOPHILS NFR BLD AUTO: 1.8 %
EOSINOPHIL # BLD AUTO: 0.31 K/UL
EOSINOPHIL NFR BLD AUTO: 4.4 %
HCT VFR BLD CALC: 38.4 %
HGB BLD-MCNC: 11.8 G/DL
IMM GRANULOCYTES NFR BLD AUTO: 1.1 %
LYMPHOCYTES # BLD AUTO: 1.44 K/UL
LYMPHOCYTES NFR BLD AUTO: 20.3 %
MAN DIFF?: NORMAL
MCHC RBC-ENTMCNC: 28.4 PG
MCHC RBC-ENTMCNC: 30.7 GM/DL
MCV RBC AUTO: 92.5 FL
MONOCYTES # BLD AUTO: 0.6 K/UL
MONOCYTES NFR BLD AUTO: 8.4 %
NEUTROPHILS # BLD AUTO: 4.55 K/UL
NEUTROPHILS NFR BLD AUTO: 64 %
PLATELET # BLD AUTO: 591 K/UL
RBC # BLD: 4.15 M/UL
RBC # FLD: 13.4 %
WBC # FLD AUTO: 7.11 K/UL

## 2022-01-05 ENCOUNTER — RX RENEWAL (OUTPATIENT)
Age: 82
End: 2022-01-05

## 2022-01-13 ENCOUNTER — RX RENEWAL (OUTPATIENT)
Age: 82
End: 2022-01-13

## 2022-03-07 LAB
ALBUMIN SERPL ELPH-MCNC: 4.2 G/DL
ALP BLD-CCNC: 71 U/L
ALT SERPL-CCNC: 15 U/L
ANION GAP SERPL CALC-SCNC: 12 MMOL/L
AST SERPL-CCNC: 20 U/L
BASOPHILS # BLD AUTO: 0.11 K/UL
BASOPHILS NFR BLD AUTO: 1.7 %
BILIRUB SERPL-MCNC: 0.2 MG/DL
BUN SERPL-MCNC: 30 MG/DL
CALCIUM SERPL-MCNC: 9.7 MG/DL
CHLORIDE SERPL-SCNC: 110 MMOL/L
CO2 SERPL-SCNC: 22 MMOL/L
CREAT SERPL-MCNC: 1.27 MG/DL
EGFR: 42 ML/MIN/1.73M2
EOSINOPHIL # BLD AUTO: 0.33 K/UL
EOSINOPHIL NFR BLD AUTO: 5 %
GLUCOSE SERPL-MCNC: 100 MG/DL
HCT VFR BLD CALC: 34.7 %
HGB BLD-MCNC: 11.1 G/DL
IMM GRANULOCYTES NFR BLD AUTO: 1.2 %
LYMPHOCYTES # BLD AUTO: 1.31 K/UL
LYMPHOCYTES NFR BLD AUTO: 19.7 %
MAN DIFF?: NORMAL
MCHC RBC-ENTMCNC: 29.4 PG
MCHC RBC-ENTMCNC: 32 GM/DL
MCV RBC AUTO: 92 FL
MONOCYTES # BLD AUTO: 0.57 K/UL
MONOCYTES NFR BLD AUTO: 8.6 %
NEUTROPHILS # BLD AUTO: 4.25 K/UL
NEUTROPHILS NFR BLD AUTO: 63.8 %
PLATELET # BLD AUTO: 624 K/UL
POTASSIUM SERPL-SCNC: 4.9 MMOL/L
PROT SERPL-MCNC: 6.3 G/DL
RBC # BLD: 3.77 M/UL
RBC # FLD: 13.8 %
SODIUM SERPL-SCNC: 145 MMOL/L
WBC # FLD AUTO: 6.65 K/UL

## 2022-03-15 ENCOUNTER — OUTPATIENT (OUTPATIENT)
Dept: OUTPATIENT SERVICES | Facility: HOSPITAL | Age: 82
LOS: 1 days | Discharge: HOME | End: 2022-03-15

## 2022-03-15 DIAGNOSIS — D64.9 ANEMIA, UNSPECIFIED: ICD-10-CM

## 2022-03-17 ENCOUNTER — APPOINTMENT (OUTPATIENT)
Dept: HEMATOLOGY ONCOLOGY | Facility: CLINIC | Age: 82
End: 2022-03-17
Payer: MEDICARE

## 2022-03-17 VITALS
HEIGHT: 63.23 IN | HEART RATE: 73 BPM | SYSTOLIC BLOOD PRESSURE: 132 MMHG | OXYGEN SATURATION: 99 % | BODY MASS INDEX: 27.31 KG/M2 | WEIGHT: 156.09 LBS | RESPIRATION RATE: 20 BRPM | DIASTOLIC BLOOD PRESSURE: 74 MMHG | TEMPERATURE: 98.2 F

## 2022-03-17 PROCEDURE — 99214 OFFICE O/P EST MOD 30 MIN: CPT

## 2022-03-17 NOTE — ASSESSMENT
[FreeTextEntry1] : Lab work reviewed.\par #1) Thrombocytosis-6/2020 bone marrow consistent with CALR+ myeloproliferative neoplasm–differential diagnosis includes pre-fibrotic stage of primary myelofibrosis and essential thrombocythemia.  \par Though age greater than 58, BONNIE 2 negative with no history of venous thrombosis, and patient is taking Plavix, so patient has wished for surveillance of interval follow-up lab work for now (and no Hydrea as recommended).  If consistent, persistent rise in platelet count--> need to reconsider cytoreductive therapy (potential thrombosis risks reviewed). Platelet count currently appears to be fluctuating.\par \par #2) anemia-patient will continue with p.o. folic acid supplementation for h/o elevated homocysteine level, and continues with p.o. vitamin B12 supplementation for h/o a level of less than 400. H/O increased creatinine may contribute to anemia as well. F/U with PCP for renal insufficiency.\par \par #3) h/o right breast cancer and more recent left breast cancer-clinically MARY-patient consents to remain on aromatase inhibitor therapy as extended adjuvant therapy. \par \par Patient was given the opportunity to ask questions.  Her questions have been answered to her apparent satisfaction at this time. She is agreeable to recommended f/u, and concurs with plans of care.\par \par \par

## 2022-03-17 NOTE — PHYSICAL EXAM
[Fully active, able to carry on all pre-disease performance without restriction] : Status 0 - Fully active, able to carry on all pre-disease performance without restriction [Normal] : affect appropriate [de-identified] : left breast without dominant mass or nipple discharge; right reconstructed breast without palpable abnormality

## 2022-03-17 NOTE — HISTORY OF PRESENT ILLNESS
[Disease: _____________________] : Disease: [unfilled] [T: ___] : T[unfilled] [N: ___] : N[unfilled] [M: ___] : M[unfilled] [AJCC Stage: ____] : AJCC Stage: [unfilled] [de-identified] : History of right breast cancer 1988-status post right mastectomy with saline implant reconstruction.\par History of left breast cancer 7/2015-Stage IA, ER positive/[place] positive/HER-2 negative. 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 Bone marrow biopsy and bone marrow aspirate consistent with CALR-positive myeloproliferative neoplasm with main differential diagnosis including pre-fibrotic stage of primary MF and essential thrombocythemia.  Normal PDGFR FISH panel. Cytogenetics with normal female karyotype.\par  [de-identified] : invasive ductal carcinoma [de-identified] : Note patient is on a lung cancer screening research protocol with [facility] for which she receives regular interval CT imaging of the chest.\par \par PCP:  Dr. Janny Ta-#[phone]\par Surgeon: Was Dr. Trevin Coy-#[phone] [de-identified] : +Hip/knee arthralgias. Has appt. with PCP within next few weeks-plans to address with her.\par No c/o CP, SOB, fevers, cough.\par No new breast pain or lumps. No c/o H/A or pruritis. No abnormal bruising or bleeding reported.\par Has appt. to see neurologist in f/u-has been on Plavix since a CVA ~10 years ago. Has no residual or new neurologic deficits.\par \par Got COVID vaccines (2/2021)-Pfizer. Had third shot as well.\par Had flu shot.\par \par \par

## 2022-04-27 ENCOUNTER — APPOINTMENT (OUTPATIENT)
Dept: RADIOLOGY | Facility: HOSPITAL | Age: 82
End: 2022-04-27
Payer: MEDICARE

## 2022-04-27 ENCOUNTER — OUTPATIENT (OUTPATIENT)
Dept: OUTPATIENT SERVICES | Facility: HOSPITAL | Age: 82
LOS: 1 days | End: 2022-04-27
Payer: MEDICARE

## 2022-04-27 DIAGNOSIS — Z00.8 ENCOUNTER FOR OTHER GENERAL EXAMINATION: ICD-10-CM

## 2022-04-27 PROCEDURE — 73562 X-RAY EXAM OF KNEE 3: CPT | Mod: 26,LT

## 2022-04-27 PROCEDURE — 73562 X-RAY EXAM OF KNEE 3: CPT

## 2022-04-27 PROCEDURE — 73502 X-RAY EXAM HIP UNI 2-3 VIEWS: CPT | Mod: 26,LT

## 2022-04-27 PROCEDURE — 73502 X-RAY EXAM HIP UNI 2-3 VIEWS: CPT

## 2022-05-02 LAB
BASOPHILS # BLD AUTO: 0.16 K/UL
BASOPHILS NFR BLD AUTO: 2.4 %
EOSINOPHIL # BLD AUTO: 0.3 K/UL
EOSINOPHIL NFR BLD AUTO: 4.6 %
HCT VFR BLD CALC: 33.8 %
HGB BLD-MCNC: 10.7 G/DL
IMM GRANULOCYTES NFR BLD AUTO: 0.6 %
LYMPHOCYTES # BLD AUTO: 1.34 K/UL
LYMPHOCYTES NFR BLD AUTO: 20.3 %
MAN DIFF?: NORMAL
MCHC RBC-ENTMCNC: 29 PG
MCHC RBC-ENTMCNC: 31.7 GM/DL
MCV RBC AUTO: 91.6 FL
MONOCYTES # BLD AUTO: 0.59 K/UL
MONOCYTES NFR BLD AUTO: 9 %
NEUTROPHILS # BLD AUTO: 4.16 K/UL
NEUTROPHILS NFR BLD AUTO: 63.1 %
PLATELET # BLD AUTO: 623 K/UL
RBC # BLD: 3.69 M/UL
RBC # FLD: 13.4 %
WBC # FLD AUTO: 6.59 K/UL

## 2022-07-01 ENCOUNTER — OUTPATIENT (OUTPATIENT)
Dept: OUTPATIENT SERVICES | Facility: HOSPITAL | Age: 82
LOS: 1 days | Discharge: HOME | End: 2022-07-01

## 2022-07-01 DIAGNOSIS — D64.9 ANEMIA, UNSPECIFIED: ICD-10-CM

## 2022-07-03 ENCOUNTER — RX RENEWAL (OUTPATIENT)
Age: 82
End: 2022-07-03

## 2022-07-03 RX ORDER — FOLIC ACID 1 MG/1
1 TABLET ORAL
Qty: 90 | Refills: 1 | Status: ACTIVE | COMMUNITY
Start: 2020-06-23 | End: 1900-01-01

## 2022-07-05 ENCOUNTER — NON-APPOINTMENT (OUTPATIENT)
Age: 82
End: 2022-07-05

## 2022-07-05 LAB
ALBUMIN SERPL ELPH-MCNC: 3.9 G/DL
ALP BLD-CCNC: 87 U/L
ALT SERPL-CCNC: 12 U/L
ANION GAP SERPL CALC-SCNC: 12 MMOL/L
AST SERPL-CCNC: 18 U/L
BASOPHILS # BLD AUTO: 0.18 K/UL
BASOPHILS NFR BLD AUTO: 2 %
BILIRUB SERPL-MCNC: 0.2 MG/DL
BUN SERPL-MCNC: 47 MG/DL
CALCIUM SERPL-MCNC: 9.6 MG/DL
CHLORIDE SERPL-SCNC: 107 MMOL/L
CO2 SERPL-SCNC: 23 MMOL/L
CREAT SERPL-MCNC: 1.61 MG/DL
EGFR: 32 ML/MIN/1.73M2
EOSINOPHIL # BLD AUTO: 0.48 K/UL
EOSINOPHIL NFR BLD AUTO: 5.3 %
GLUCOSE SERPL-MCNC: 105 MG/DL
HCT VFR BLD CALC: 33.9 %
HGB BLD-MCNC: 11 G/DL
IMM GRANULOCYTES NFR BLD AUTO: 1.7 %
LYMPHOCYTES # BLD AUTO: 1.28 K/UL
LYMPHOCYTES NFR BLD AUTO: 14.3 %
MAN DIFF?: NORMAL
MCHC RBC-ENTMCNC: 29.6 PG
MCHC RBC-ENTMCNC: 32.4 GM/DL
MCV RBC AUTO: 91.4 FL
MONOCYTES # BLD AUTO: 0.92 K/UL
MONOCYTES NFR BLD AUTO: 10.2 %
NEUTROPHILS # BLD AUTO: 5.97 K/UL
NEUTROPHILS NFR BLD AUTO: 66.5 %
PLATELET # BLD AUTO: 620 K/UL
POTASSIUM SERPL-SCNC: 5.6 MMOL/L
PROT SERPL-MCNC: 6.4 G/DL
RBC # BLD: 3.71 M/UL
RBC # FLD: 13.6 %
SODIUM SERPL-SCNC: 142 MMOL/L
WBC # FLD AUTO: 8.98 K/UL

## 2022-07-08 ENCOUNTER — RX RENEWAL (OUTPATIENT)
Age: 82
End: 2022-07-08

## 2022-07-10 ENCOUNTER — NON-APPOINTMENT (OUTPATIENT)
Age: 82
End: 2022-07-10

## 2022-07-13 ENCOUNTER — APPOINTMENT (OUTPATIENT)
Dept: HEMATOLOGY ONCOLOGY | Facility: CLINIC | Age: 82
End: 2022-07-13

## 2022-07-13 VITALS
TEMPERATURE: 97.2 F | BODY MASS INDEX: 26.08 KG/M2 | OXYGEN SATURATION: 99 % | HEART RATE: 91 BPM | RESPIRATION RATE: 16 BRPM | DIASTOLIC BLOOD PRESSURE: 67 MMHG | WEIGHT: 149.03 LBS | HEIGHT: 63.23 IN | SYSTOLIC BLOOD PRESSURE: 115 MMHG

## 2022-07-13 PROCEDURE — 99214 OFFICE O/P EST MOD 30 MIN: CPT

## 2022-07-13 RX ORDER — BROMFENAC SODIUM 0.7 MG/ML
0.07 SOLUTION/ DROPS OPHTHALMIC
Qty: 3 | Refills: 0 | Status: ACTIVE | COMMUNITY
Start: 2022-04-06

## 2022-07-13 NOTE — ASSESSMENT
[FreeTextEntry1] : Lab work reviewed.\par #1) Thrombocytosis-6/2020 bone marrow consistent with CALR+ myeloproliferative neoplasm–differential diagnosis includes pre-fibrotic stage of primary myelofibrosis and essential thrombocythemia.  \par Though age greater than 58, BONNIE 2 negative with no history of venous thrombosis, and patient is taking Plavix, so patient has wished for surveillance of interval follow-up lab work for now (and no Hydrea as has been recommended to her).  If consistent, persistent rise in platelet count--> patient to reconsider cytoreductive therapy (potential thrombosis risks reviewed). Platelet count currently appears to be fluctuating.\par \par #2) anemia-patient will continue with p.o. folic acid supplementation for h/o elevated homocysteine level, and continues with p.o. vitamin B12 supplementation for h/o a level of less than 400. H/O increased creatinine may contribute to anemia as well. To see nephrologsit for renal insufficiency as planned.\par \par #3) h/o right breast cancer and more recent left breast cancer-clinically MARY-patient consents to remain on aromatase inhibitor therapy as extended adjuvant therapy (will complete 7 year course 10/2022). Next breast imaging ordered.\par \par Patient was given the opportunity to ask questions.  Her questions have been answered to her apparent satisfaction at this time. She is agreeable to recommended f/u, and concurs with plans of care.\par \par \par

## 2022-07-13 NOTE — PHYSICAL EXAM
[de-identified] : left breast without dominant mass or nipple discharge; right reconstructed breast without palpable abnormality

## 2022-07-13 NOTE — HISTORY OF PRESENT ILLNESS
[de-identified] : History of right breast cancer 1988-status post right mastectomy with saline implant reconstruction.\par History of left breast cancer 7/2015-Stage IA, ER positive/CT positive/HER-2 negative. 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 Bone marrow biopsy and bone marrow aspirate consistent with CALR-positive myeloproliferative neoplasm with main differential diagnosis including pre-fibrotic stage of primary MF and essential thrombocythemia.  Normal PDGFR FISH panel. Cytogenetics with normal female karyotype.\par  [de-identified] : invasive ductal carcinoma [de-identified] : Note patient is on a lung cancer screening research protocol with [facility] for which she receives regular interval CT imaging of the chest.\par \par PCP:  Dr. Janny Ta-#[phone]\par Surgeon: Was Dr. Trevin Coy-#[phone] [de-identified] : No c/o CP, SOB, fevers, cough.\par No new breast pain or lumps. No c/o H/A or pruritis. No abnormal bruising or bleeding reported.\par Sees neurologist-has been on Plavix since a CVA ~10 years ago. Has no residual or new neurologic deficits.\par Referred by PCP to see nephrologist for elevated creatinine.\par \par Has had COVID vaccines (2/2021)-Pfizer. Had third shot as well.\par \par \par \par

## 2022-09-01 ENCOUNTER — NON-APPOINTMENT (OUTPATIENT)
Age: 82
End: 2022-09-01

## 2022-09-01 LAB
BASOPHILS # BLD AUTO: 0.17 K/UL
BASOPHILS NFR BLD AUTO: 2 %
EOSINOPHIL # BLD AUTO: 0.43 K/UL
EOSINOPHIL NFR BLD AUTO: 5.2 %
HCT VFR BLD CALC: 34.8 %
HGB BLD-MCNC: 11 G/DL
IMM GRANULOCYTES NFR BLD AUTO: 0.8 %
LYMPHOCYTES # BLD AUTO: 1.38 K/UL
LYMPHOCYTES NFR BLD AUTO: 16.6 %
MAN DIFF?: NORMAL
MCHC RBC-ENTMCNC: 29.6 PG
MCHC RBC-ENTMCNC: 31.6 GM/DL
MCV RBC AUTO: 93.5 FL
MONOCYTES # BLD AUTO: 0.73 K/UL
MONOCYTES NFR BLD AUTO: 8.8 %
NEUTROPHILS # BLD AUTO: 5.53 K/UL
NEUTROPHILS NFR BLD AUTO: 66.6 %
PLATELET # BLD AUTO: 680 K/UL
RBC # BLD: 3.72 M/UL
RBC # FLD: 14.2 %
WBC # FLD AUTO: 8.31 K/UL

## 2022-11-01 ENCOUNTER — LABORATORY RESULT (OUTPATIENT)
Age: 82
End: 2022-11-01

## 2022-11-09 ENCOUNTER — OUTPATIENT (OUTPATIENT)
Dept: OUTPATIENT SERVICES | Facility: HOSPITAL | Age: 82
LOS: 1 days | Discharge: HOME | End: 2022-11-09

## 2022-11-09 DIAGNOSIS — D64.9 ANEMIA, UNSPECIFIED: ICD-10-CM

## 2022-11-17 ENCOUNTER — APPOINTMENT (OUTPATIENT)
Dept: HEMATOLOGY ONCOLOGY | Facility: CLINIC | Age: 82
End: 2022-11-17

## 2022-12-08 ENCOUNTER — APPOINTMENT (OUTPATIENT)
Dept: HEMATOLOGY ONCOLOGY | Facility: CLINIC | Age: 82
End: 2022-12-08

## 2022-12-08 PROCEDURE — 99443: CPT | Mod: 95

## 2022-12-08 NOTE — REASON FOR VISIT
[Home] : at home, [unfilled] , at the time of the visit. [Medical Office: ([facility])___] : at the medical office located in  [Verbal consent obtained from patient] : the patient, [unfilled] [Follow-Up Visit] : a follow-up [FreeTextEntry2] : breast cancer

## 2022-12-08 NOTE — HISTORY OF PRESENT ILLNESS
[Disease: _____________________] : Disease: [unfilled] [T: ___] : T[unfilled] [N: ___] : N[unfilled] [M: ___] : M[unfilled] [AJCC Stage: ____] : AJCC Stage: [unfilled] [de-identified] : History of right breast cancer 1988-status post right mastectomy with saline implant reconstruction.\par History of left breast cancer 7/2015-Stage IA, ER positive/[place] positive/HER-2 negative. 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 Bone marrow biopsy and bone marrow aspirate consistent with CALR-positive myeloproliferative neoplasm with main differential diagnosis including pre-fibrotic stage of primary MF and essential thrombocythemia.  Normal PDGFR FISH panel. Cytogenetics with normal female karyotype.\par  [de-identified] : invasive ductal carcinoma [de-identified] : Note patient is on a lung cancer screening research protocol with [facility] for which she receives regular interval CT imaging of the chest.\par \par PCP:  Dr. Janny Ta-#[phone]\par Surgeon: Was Dr. Trevin Coy-#[phone] [de-identified] : 10/9073-[id number] on left side-symptoms resolved after ~ 15 minutes. She took an asprin, but did not want to go to ED. Has neurologist appt. planned. \par No current c/o CP, SOB, fevers, cough. No recurrent neurologic complaints.\par No new breast pain or lumps. No c/o pruritis No abnormal bruising or bleeding reported.\par Told mammogram done at [facility]-unremarkable.\par [place].\par \par Has had COVID vaccines (2/2021)-Pfizer. Had third shot as well.\par \par \par \par

## 2022-12-08 NOTE — ASSESSMENT
[FreeTextEntry1] : Lab work reviewed.\par #1) Thrombocytosis-6/2020 bone marrow consistent with CALR+ myeloproliferative neoplasm–differential diagnosis includes pre-fibrotic stage of primary myelofibrosis and essential thrombocythemia.  \par Though age greater than 58, BONNIE 2 negative with no history of venous thrombosis, and patient has been taking Plavix, so patient has wished for surveillance of interval follow-up lab work (and no Hydrea as had been recommended to her).  With recent neurologic event/possible TIA, and increasing platelet trend, I have agin recommended cytoreductive therapy with Hydrea–potential side effects reviewed including but not limited to mucositis, nausea/vomiting, allergic reaction, myelosuppression.  Patient is now agreeable to begin Hydrea.  CBC in 1 week.  She will follow-up with neurology as planned, as well.\par \par #2) anemia-patient will continue with p.o. folic acid supplementation for h/o elevated homocysteine level, and continues with p.o. vitamin B12 supplementation for h/o a level of less than 400. H/O increased creatinine may contribute to anemia as well. F/U with nephrologsit for h/o renal insufficiency.\par \par #3) h/o right breast cancer and more recent left breast cancer-patient completed 7 year course of Anastrozole 10/2022, and does wish to discontinue at this time. Surveillance. Have requested a copy of recent breast imaging report.\par \par Patient was given the opportunity to ask questions.  Her questions have been answered to her apparent satisfaction. She is agreeable to recommended f/u, and concurs with plans of care.\par \par \par

## 2022-12-15 ENCOUNTER — NON-APPOINTMENT (OUTPATIENT)
Age: 82
End: 2022-12-15

## 2022-12-15 LAB
BASOPHILS # BLD AUTO: 0.17 K/UL
BASOPHILS NFR BLD AUTO: 1.9 %
EOSINOPHIL # BLD AUTO: 0.36 K/UL
EOSINOPHIL NFR BLD AUTO: 4.1 %
HCT VFR BLD CALC: 35.5 %
HGB BLD-MCNC: 11.2 G/DL
IMM GRANULOCYTES NFR BLD AUTO: 1.7 %
LYMPHOCYTES # BLD AUTO: 1.69 K/UL
LYMPHOCYTES NFR BLD AUTO: 19.3 %
MAN DIFF?: NORMAL
MCHC RBC-ENTMCNC: 29 PG
MCHC RBC-ENTMCNC: 31.5 GM/DL
MCV RBC AUTO: 92 FL
MONOCYTES # BLD AUTO: 0.61 K/UL
MONOCYTES NFR BLD AUTO: 7 %
NEUTROPHILS # BLD AUTO: 5.78 K/UL
NEUTROPHILS NFR BLD AUTO: 66 %
PLATELET # BLD AUTO: 691 K/UL
RBC # BLD: 3.86 M/UL
RBC # FLD: 14.1 %
WBC # FLD AUTO: 8.76 K/UL

## 2022-12-16 LAB
ALBUMIN SERPL ELPH-MCNC: 4.4 G/DL
ALP BLD-CCNC: 79 U/L
ALT SERPL-CCNC: 9 U/L
ANION GAP SERPL CALC-SCNC: 15 MMOL/L
AST SERPL-CCNC: 17 U/L
BILIRUB SERPL-MCNC: 0.2 MG/DL
BUN SERPL-MCNC: 37 MG/DL
CALCIUM SERPL-MCNC: 10.4 MG/DL
CHLORIDE SERPL-SCNC: 108 MMOL/L
CO2 SERPL-SCNC: 21 MMOL/L
CREAT SERPL-MCNC: 1.27 MG/DL
EGFR: 42 ML/MIN/1.73M2
GLUCOSE SERPL-MCNC: 115 MG/DL
POTASSIUM SERPL-SCNC: 5.1 MMOL/L
PROT SERPL-MCNC: 6.5 G/DL
SODIUM SERPL-SCNC: 144 MMOL/L

## 2022-12-29 ENCOUNTER — NON-APPOINTMENT (OUTPATIENT)
Age: 82
End: 2022-12-29

## 2022-12-29 LAB
BASOPHILS # BLD AUTO: 0.12 K/UL
BASOPHILS NFR BLD AUTO: 1.5 %
EOSINOPHIL # BLD AUTO: 0.32 K/UL
EOSINOPHIL NFR BLD AUTO: 4 %
HCT VFR BLD CALC: 32.6 %
HGB BLD-MCNC: 10.4 G/DL
IMM GRANULOCYTES NFR BLD AUTO: 0.5 %
LYMPHOCYTES # BLD AUTO: 1.34 K/UL
LYMPHOCYTES NFR BLD AUTO: 16.8 %
MAN DIFF?: NORMAL
MCHC RBC-ENTMCNC: 30.1 PG
MCHC RBC-ENTMCNC: 31.9 GM/DL
MCV RBC AUTO: 94.2 FL
MONOCYTES # BLD AUTO: 0.59 K/UL
MONOCYTES NFR BLD AUTO: 7.4 %
NEUTROPHILS # BLD AUTO: 5.57 K/UL
NEUTROPHILS NFR BLD AUTO: 69.8 %
PLATELET # BLD AUTO: 632 K/UL
RBC # BLD: 3.46 M/UL
RBC # FLD: 15.9 %
WBC # FLD AUTO: 7.98 K/UL

## 2023-01-04 ENCOUNTER — RX RENEWAL (OUTPATIENT)
Age: 83
End: 2023-01-04

## 2023-01-12 LAB
ALBUMIN SERPL ELPH-MCNC: 4.2 G/DL
ALP BLD-CCNC: 65 U/L
ALT SERPL-CCNC: 11 U/L
AST SERPL-CCNC: 17 U/L
BASOPHILS # BLD AUTO: 0.14 K/UL
BASOPHILS NFR BLD AUTO: 2.1 %
BILIRUB DIRECT SERPL-MCNC: 0.1 MG/DL
BILIRUB INDIRECT SERPL-MCNC: 0.2 MG/DL
BILIRUB SERPL-MCNC: 0.3 MG/DL
CREAT SERPL-MCNC: 1.18 MG/DL
EGFR: 46 ML/MIN/1.73M2
EOSINOPHIL # BLD AUTO: 0.27 K/UL
EOSINOPHIL NFR BLD AUTO: 4.1 %
HCT VFR BLD CALC: 33.7 %
HGB BLD-MCNC: 10.6 G/DL
IMM GRANULOCYTES NFR BLD AUTO: 1.1 %
LYMPHOCYTES # BLD AUTO: 1.39 K/UL
LYMPHOCYTES NFR BLD AUTO: 21.1 %
MAN DIFF?: NORMAL
MCHC RBC-ENTMCNC: 29.7 PG
MCHC RBC-ENTMCNC: 31.5 GM/DL
MCV RBC AUTO: 94.4 FL
MONOCYTES # BLD AUTO: 0.69 K/UL
MONOCYTES NFR BLD AUTO: 10.5 %
NEUTROPHILS # BLD AUTO: 4.02 K/UL
NEUTROPHILS NFR BLD AUTO: 61.1 %
PLATELET # BLD AUTO: 594 K/UL
PROT SERPL-MCNC: 6.4 G/DL
RBC # BLD: 3.57 M/UL
RBC # FLD: 17.2 %
WBC # FLD AUTO: 6.58 K/UL

## 2023-02-06 LAB
BASOPHILS # BLD AUTO: 0.19 K/UL
BASOPHILS NFR BLD AUTO: 2.5 %
EOSINOPHIL # BLD AUTO: 0.25 K/UL
EOSINOPHIL NFR BLD AUTO: 3.3 %
HCT VFR BLD CALC: 33.7 %
HGB BLD-MCNC: 10.7 G/DL
IMM GRANULOCYTES NFR BLD AUTO: 1.6 %
LYMPHOCYTES # BLD AUTO: 1.65 K/UL
LYMPHOCYTES NFR BLD AUTO: 21.9 %
MAN DIFF?: NORMAL
MCHC RBC-ENTMCNC: 30.9 PG
MCHC RBC-ENTMCNC: 31.8 GM/DL
MCV RBC AUTO: 97.4 FL
MONOCYTES # BLD AUTO: 0.7 K/UL
MONOCYTES NFR BLD AUTO: 9.3 %
NEUTROPHILS # BLD AUTO: 4.64 K/UL
NEUTROPHILS NFR BLD AUTO: 61.4 %
PLATELET # BLD AUTO: 639 K/UL
RBC # BLD: 3.46 M/UL
RBC # FLD: 18.7 %
WBC # FLD AUTO: 7.55 K/UL

## 2023-02-20 LAB
BASOPHILS # BLD AUTO: 0.14 K/UL
BASOPHILS NFR BLD AUTO: 1.4 %
EOSINOPHIL # BLD AUTO: 0.28 K/UL
EOSINOPHIL NFR BLD AUTO: 2.9 %
HCT VFR BLD CALC: 34.5 %
HGB BLD-MCNC: 10.7 G/DL
IMM GRANULOCYTES NFR BLD AUTO: 1.6 %
LYMPHOCYTES # BLD AUTO: 1.11 K/UL
LYMPHOCYTES NFR BLD AUTO: 11.4 %
MAN DIFF?: NORMAL
MCHC RBC-ENTMCNC: 31 GM/DL
MCHC RBC-ENTMCNC: 31.4 PG
MCV RBC AUTO: 101.2 FL
MONOCYTES # BLD AUTO: 0.84 K/UL
MONOCYTES NFR BLD AUTO: 8.7 %
NEUTROPHILS # BLD AUTO: 7.18 K/UL
NEUTROPHILS NFR BLD AUTO: 74 %
PLATELET # BLD AUTO: 670 K/UL
RBC # BLD: 3.41 M/UL
RBC # FLD: 17.9 %
WBC # FLD AUTO: 9.71 K/UL

## 2023-02-22 ENCOUNTER — NON-APPOINTMENT (OUTPATIENT)
Age: 83
End: 2023-02-22

## 2023-03-06 LAB
BASOPHILS # BLD AUTO: 0.12 K/UL
BASOPHILS NFR BLD AUTO: 1.7 %
EOSINOPHIL # BLD AUTO: 0.35 K/UL
EOSINOPHIL NFR BLD AUTO: 4.9 %
HCT VFR BLD CALC: 31.3 %
HGB BLD-MCNC: 10.3 G/DL
IMM GRANULOCYTES NFR BLD AUTO: 1.4 %
LYMPHOCYTES # BLD AUTO: 1.62 K/UL
LYMPHOCYTES NFR BLD AUTO: 22.8 %
MAN DIFF?: NORMAL
MCHC RBC-ENTMCNC: 32.8 PG
MCHC RBC-ENTMCNC: 32.9 GM/DL
MCV RBC AUTO: 99.7 FL
MONOCYTES # BLD AUTO: 0.63 K/UL
MONOCYTES NFR BLD AUTO: 8.9 %
NEUTROPHILS # BLD AUTO: 4.29 K/UL
NEUTROPHILS NFR BLD AUTO: 60.3 %
PLATELET # BLD AUTO: 534 K/UL
RBC # BLD: 3.14 M/UL
RBC # FLD: 18 %
WBC # FLD AUTO: 7.11 K/UL

## 2023-03-13 ENCOUNTER — NON-APPOINTMENT (OUTPATIENT)
Age: 83
End: 2023-03-13

## 2023-03-16 ENCOUNTER — OUTPATIENT (OUTPATIENT)
Dept: OUTPATIENT SERVICES | Facility: HOSPITAL | Age: 83
LOS: 1 days | Discharge: HOME | End: 2023-03-16

## 2023-03-16 DIAGNOSIS — D64.9 ANEMIA, UNSPECIFIED: ICD-10-CM

## 2023-03-20 ENCOUNTER — APPOINTMENT (OUTPATIENT)
Dept: HEMATOLOGY ONCOLOGY | Facility: CLINIC | Age: 83
End: 2023-03-20
Payer: MEDICARE

## 2023-03-20 VITALS
BODY MASS INDEX: 26.58 KG/M2 | SYSTOLIC BLOOD PRESSURE: 154 MMHG | HEIGHT: 63 IN | RESPIRATION RATE: 16 BRPM | HEART RATE: 91 BPM | DIASTOLIC BLOOD PRESSURE: 80 MMHG | OXYGEN SATURATION: 99 % | TEMPERATURE: 97 F | WEIGHT: 150 LBS

## 2023-03-20 PROCEDURE — 99214 OFFICE O/P EST MOD 30 MIN: CPT

## 2023-03-20 NOTE — PHYSICAL EXAM
[Normal] : affect appropriate [de-identified] : left breast without dominant mass or nipple discharge; right reconstructed breast without palpable abnormality.

## 2023-03-20 NOTE — HISTORY OF PRESENT ILLNESS
[Disease: _____________________] : Disease: [unfilled] [T: ___] : T[unfilled] [N: ___] : N[unfilled] [M: ___] : M[unfilled] [AJCC Stage: ____] : AJCC Stage: [unfilled] [de-identified] : History of right breast cancer 1988-status post right mastectomy with saline implant reconstruction.\par History of left breast cancer 7/2015-Stage IA, ER positive/[place] positive/HER-2 negative. 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 Bone marrow biopsy and bone marrow aspirate consistent with CALR-positive myeloproliferative neoplasm with main differential diagnosis including pre-fibrotic stage of primary MF and essential thrombocythemia.  Normal PDGFR FISH panel. Cytogenetics with normal female karyotype.\par \par 10/2022-Had TIA.\par 12/2022-Agreed to start Hydrea.\par  [de-identified] : invasive ductal carcinoma [de-identified] : Note patient is on a lung cancer screening research protocol with [facility] for which she receives regular interval CT imaging of the chest.\par \par Surgeon: Was Dr. Trevin Coy-#[phone] [de-identified] : Had CVA, and further eval. by new neurologist Dr. Moran ([facility])-states eval. unremarkable, but they did not feel platelet related. Has plans to see a cardiologist.\par No current c/o CP, SOB, fevers, cough. No recurrent neurologic complaints.\par No new breast pain or lumps. No c/o pruritis No abnormal bruising or bleeding reported.\par \par Most of doctors-[facility] [place].\par \par \par \par \par \par

## 2023-03-20 NOTE — ASSESSMENT
[FreeTextEntry1] : Lab work reviewed.\par #1) Thrombocytosis-6/2020 bone marrow consistent with CALR+ myeloproliferative neoplasm–differential diagnosis includes pre-fibrotic stage of primary myelofibrosis and essential thrombocythemia.  \par Though age greater than 60, BONNIE 2 negative with no history of venous thrombosis, and patient had been taking Plavix, so patient had wished for surveillance of interval follow-up lab work (and no Hydrea as had been recommended to her).  \par 10/2022-Had TIA.\par 12/2022-Patient agreed to start Hydrea.\par \par #2) anemia-patient will continue with p.o. folic acid supplementation for h/o elevated homocysteine level, and continues with p.o. vitamin B12 supplementation for h/o a level of less than 400. H/O increased creatinine may contribute to anemia as well. F/U with nephrologsit for h/o renal insufficiency.\par \par #3) History of right breast cancer 1988-status post right mastectomy with saline implant reconstruction.\par \par History of left breast cancer 7/2015-Stage IA, ER positive/ID positive/HER-2 negative. Status post left breast lumpectomy/sentinel lymph node dissection-->RT--> Arimidex 10/2015-10/2022.\par Clinically MARY. Surveillance. Have requested a copy of recent breast imaging report.\par \par Patient was given the opportunity to ask questions.  Her questions have been answered to her apparent satisfaction. She is agreeable to recommended f/u, and concurs with plans of care.\par \par \par

## 2023-03-27 ENCOUNTER — LABORATORY RESULT (OUTPATIENT)
Age: 83
End: 2023-03-27

## 2023-03-27 ENCOUNTER — NON-APPOINTMENT (OUTPATIENT)
Age: 83
End: 2023-03-27

## 2023-03-27 LAB
ALBUMIN SERPL ELPH-MCNC: 4.2 G/DL
ALP BLD-CCNC: 69 U/L
ALT SERPL-CCNC: 18 U/L
AST SERPL-CCNC: 21 U/L
BASOPHILS # BLD AUTO: 0.13 K/UL
BASOPHILS NFR BLD AUTO: 1.7 %
BILIRUB DIRECT SERPL-MCNC: 0.1 MG/DL
BILIRUB INDIRECT SERPL-MCNC: 0.1 MG/DL
BILIRUB SERPL-MCNC: 0.2 MG/DL
CREAT SERPL-MCNC: 1.18 MG/DL
EGFR: 46 ML/MIN/1.73M2
EOSINOPHIL # BLD AUTO: 0.3 K/UL
EOSINOPHIL NFR BLD AUTO: 4 %
HCT VFR BLD CALC: 34 %
HGB BLD-MCNC: 10.7 G/DL
IMM GRANULOCYTES NFR BLD AUTO: 0.5 %
LYMPHOCYTES # BLD AUTO: 1.33 K/UL
LYMPHOCYTES NFR BLD AUTO: 17.8 %
MAN DIFF?: NORMAL
MCHC RBC-ENTMCNC: 31.5 GM/DL
MCHC RBC-ENTMCNC: 33.5 PG
MCV RBC AUTO: 106.6 FL
MONOCYTES # BLD AUTO: 0.66 K/UL
MONOCYTES NFR BLD AUTO: 8.8 %
NEUTROPHILS # BLD AUTO: 5 K/UL
NEUTROPHILS NFR BLD AUTO: 67.2 %
PLATELET # BLD AUTO: 506 K/UL
PROT SERPL-MCNC: 6.3 G/DL
RBC # BLD: 3.19 M/UL
RBC # FLD: 16.6 %
WBC # FLD AUTO: 7.46 K/UL

## 2023-05-01 ENCOUNTER — APPOINTMENT (OUTPATIENT)
Dept: HEMATOLOGY ONCOLOGY | Facility: CLINIC | Age: 83
End: 2023-05-01
Payer: MEDICARE

## 2023-05-01 PROCEDURE — 99214 OFFICE O/P EST MOD 30 MIN: CPT | Mod: 95

## 2023-05-01 NOTE — HISTORY OF PRESENT ILLNESS
[Disease: _____________________] : Disease: [unfilled] [T: ___] : T[unfilled] [N: ___] : N[unfilled] [M: ___] : M[unfilled] [AJCC Stage: ____] : AJCC Stage: [unfilled] [de-identified] : History of right breast cancer 1988-status post right mastectomy with saline implant reconstruction.\par History of left breast cancer 7/2015-Stage IA, ER positive/[place] positive/HER-2 negative. 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 Bone marrow biopsy and bone marrow aspirate consistent with CALR-positive myeloproliferative neoplasm with main differential diagnosis including pre-fibrotic stage of primary MF and essential thrombocythemia.  Normal PDGFR FISH panel. Cytogenetics with normal female karyotype.\par \par 10/2022-Had TIA.\par 12/2022-Agreed to start Hydrea.\par  [de-identified] : invasive ductal carcinoma [de-identified] : Note patient is on a lung cancer screening research protocol with [facility] for which she receives regular interval CT imaging of the chest.\par \par Surgeon: Was Dr. Trevin Coy-#[phone] [de-identified] : Had extensive neurologic eval.-told had small CVA in January. Neurologist is Dr. Moran ([facility]).\par States cardiac eval. unremarkable.\par Taking PO vitamin B6 per neurologist. Received PT for balance.\par No current c/o CP, SOB, fevers, cough. No recurrent neurologic complaints.\par No new breast pain or lumps. No c/o pruritis. No abnormal bruising or bleeding reported.\par \par Most of doctors-[facility] [place].\par \par \par \par \par \par

## 2023-05-01 NOTE — ASSESSMENT
[FreeTextEntry1] : Lab work reviewed.\par #1) Thrombocytosis-6/2020 bone marrow consistent with CALR+ myeloproliferative neoplasm–differential diagnosis includes pre-fibrotic stage of primary myelofibrosis and essential thrombocythemia.  \par Though age greater than 58, BONNIE 2 negative with no history of venous thrombosis, and patient had been taking Plavix, so patient had wished for surveillance of interval follow-up lab work (and no Hydrea as had been recommended to her).  \par 10/2022-Had TIA.\par 12/2022-Patient agreed to start Hydrea-adjustments based on interval lab work..\par \par #2) anemia-patient will continue with p.o. folic acid supplementation for h/o elevated homocysteine level, and continues with p.o. vitamin B12 supplementation for h/o a level of less than 400. H/O increased creatinine may contribute to anemia as well as Hydrea. F/U with nephrologist for h/o renal insufficiency.\par \par #3) History of right breast cancer 1988-status post right mastectomy with saline implant reconstruction.\par \par History of left breast cancer 7/2015-Stage IA, ER positive/[place] positive/HER-2 negative. Status post left breast lumpectomy/sentinel lymph node dissection-->RT--> Arimidex 10/2015-10/2022.\par Clinically MARY. Surveillance. Have again requested a copy of recent breast imaging report ([facility]).\par \par Patient was given the opportunity to ask questions.  Her questions have been answered to her apparent satisfaction. She is agreeable to recommended f/u, and concurs with plans of care.\par \par \par

## 2023-05-01 NOTE — REASON FOR VISIT
[Follow-Up Visit] : a follow-up [Home] : at home, [unfilled] , at the time of the visit. [Medical Office: ([facility])___] : at the medical office located in  [Patient] : the patient [Self] : self [FreeTextEntry2] : breast cancer

## 2023-05-01 NOTE — PHYSICAL EXAM
[Normal] : affect appropriate [de-identified] : breathing appeared unlabored [de-identified] : coloration appeared normal

## 2023-06-12 ENCOUNTER — NON-APPOINTMENT (OUTPATIENT)
Age: 83
End: 2023-06-12

## 2023-06-12 ENCOUNTER — LABORATORY RESULT (OUTPATIENT)
Age: 83
End: 2023-06-12

## 2023-06-12 LAB
BASOPHILS # BLD AUTO: 0.06 K/UL
BASOPHILS NFR BLD AUTO: 0.9 %
EOSINOPHIL # BLD AUTO: 0.37 K/UL
EOSINOPHIL NFR BLD AUTO: 5.2 %
HCT VFR BLD CALC: 32.3 %
HGB BLD-MCNC: 10.1 G/DL
LYMPHOCYTES # BLD AUTO: 0.93 K/UL
LYMPHOCYTES NFR BLD AUTO: 13 %
MAN DIFF?: NORMAL
MCHC RBC-ENTMCNC: 31.3 GM/DL
MCHC RBC-ENTMCNC: 34.7 PG
MCV RBC AUTO: 111 FL
MONOCYTES # BLD AUTO: 0.19 K/UL
MONOCYTES NFR BLD AUTO: 2.6 %
NEUTROPHILS # BLD AUTO: 5.57 K/UL
NEUTROPHILS NFR BLD AUTO: 78.3 %
PLATELET # BLD AUTO: 528 K/UL
RBC # BLD: 2.91 M/UL
RBC # FLD: 13.9 %
WBC # FLD AUTO: 7.12 K/UL

## 2023-06-14 ENCOUNTER — RX RENEWAL (OUTPATIENT)
Age: 83
End: 2023-06-14

## 2023-07-17 ENCOUNTER — LABORATORY RESULT (OUTPATIENT)
Age: 83
End: 2023-07-17

## 2023-07-17 LAB
ALBUMIN SERPL ELPH-MCNC: 4.2 G/DL
ALP BLD-CCNC: 76 U/L
ALT SERPL-CCNC: 16 U/L
AST SERPL-CCNC: 21 U/L
BILIRUB DIRECT SERPL-MCNC: 0.1 MG/DL
BILIRUB INDIRECT SERPL-MCNC: 0.2 MG/DL
BILIRUB SERPL-MCNC: 0.3 MG/DL
CREAT SERPL-MCNC: 1.37 MG/DL
EGFR: 38 ML/MIN/1.73M2
FERRITIN SERPL-MCNC: 186 NG/ML
FOLATE SERPL-MCNC: >20 NG/ML
IRON SATN MFR SERPL: 32 %
IRON SERPL-MCNC: 95 UG/DL
PROT SERPL-MCNC: 6.2 G/DL
TIBC SERPL-MCNC: 300 UG/DL
UIBC SERPL-MCNC: 205 UG/DL
VIT B12 SERPL-MCNC: 1404 PG/ML

## 2023-07-25 ENCOUNTER — OUTPATIENT (OUTPATIENT)
Dept: OUTPATIENT SERVICES | Facility: HOSPITAL | Age: 83
LOS: 1 days | Discharge: HOME | End: 2023-07-25

## 2023-07-25 DIAGNOSIS — D64.9 ANEMIA, UNSPECIFIED: ICD-10-CM

## 2023-08-03 ENCOUNTER — APPOINTMENT (OUTPATIENT)
Dept: HEMATOLOGY ONCOLOGY | Facility: CLINIC | Age: 83
End: 2023-08-03
Payer: MEDICARE

## 2023-08-03 DIAGNOSIS — C50.911 MALIGNANT NEOPLASM OF UNSPECIFIED SITE OF RIGHT FEMALE BREAST: ICD-10-CM

## 2023-08-03 PROCEDURE — 99443: CPT | Mod: 95

## 2023-08-03 NOTE — ASSESSMENT
[FreeTextEntry1] : Lab work reviewed. 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   Though age greater than 58, BONNIE 2 negative with no history of venous thrombosis, and patient had been taking Plavix, so patient had wished for surveillance of interval follow-up lab work (and no Hydrea as had been recommended to her).   10/2022-Had TIA. 12/2022-Patient agreed to start Hydrea-adjustments based on interval lab work.  #2) anemia-patient will continue with p.o. folic acid supplementation for h/o elevated homocysteine level, and will hold p.o. vitamin B12 supplementation (level increased). H/O increased creatinine may contribute to anemia as well as Hydrea. F/U with nephrologist for h/o renal insufficiency.  #3) History of right breast cancer 1988-status post right mastectomy with saline implant reconstruction.  History of left breast cancer 7/2015-Stage IA, ER positive/DC positive/HER-2 negative. Status post left breast lumpectomy/sentinel lymph node dissection-->RT--> Arimidex 10/2015-10/2022. 11/3/2022-Left Mammogram/breast US-Benign oitbvzct-EL-QMHA 2. Gets breast imaging scripts from  GYN MD. Clinically MARY. Surveillance.   Patient was given the opportunity to ask questions.  Her questions have been answered to her apparent satisfaction. She is agreeable to recommended f/u and concurs with plans of care.  --> Folic acid 1 mg PO daily. Aspirin 81 mg PO daily. Hydrea 1000 mg p.o. q. Njgbta-Iaiabdrjn-Oymhmg and 500 mg p.o. on remaining days of the week; CBC Q 6 weeks; RTC 3 months-office.

## 2023-08-03 NOTE — REASON FOR VISIT
[Follow-Up Visit] : a follow-up [Patient] : the patient [Self] : self [Home] : at home, [unfilled] , at the time of the visit. [Medical Office: ([facility])___] : at the medical office located in  [Verbal consent obtained from patient] : the patient, [unfilled] [FreeTextEntry2] : breast cancer

## 2023-08-03 NOTE — HISTORY OF PRESENT ILLNESS
[Disease: _____________________] : Disease: [unfilled] [T: ___] : T[unfilled] [N: ___] : N[unfilled] [M: ___] : M[unfilled] [AJCC Stage: ____] : AJCC Stage: [unfilled] [de-identified] : History of right breast cancer 1988-status post right mastectomy with saline implant reconstruction.\par  History of left breast cancer 7/2015-Stage IA, ER positive/OR positive/HER-2 negative. 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 Bone marrow biopsy and bone marrow aspirate consistent with CALR-positive myeloproliferative neoplasm with main differential diagnosis including pre-fibrotic stage of primary MF and essential thrombocythemia.  Normal PDGFR FISH panel. Cytogenetics with normal female karyotype.\par  \par  10/2022-Had TIA.\par  12/2022-Agreed to start Hydrea.\par   [de-identified] : invasive ductal carcinoma [de-identified] : Note patient is on a lung cancer screening research protocol with [facility] for which she receives regular interval CT imaging of the chest.\par  \par  Surgeon: Was Dr. Trevin Coy-#[phone] [de-identified] : Low spine issue-plans to see neurologist in f/u. Had small CVA in January. Neurologist is Dr. Moran ([facility]). Remains on plavix. No current c/o CP, SOB, fevers, cough. No new breast pain or lumps. No c/o pruritis. No abnormal bruising or bleeding reported.  Most of doctors-[facility].

## 2023-08-03 NOTE — PHYSICAL EXAM
[Fully active, able to carry on all pre-disease performance without restriction] : Status 0 - Fully active, able to carry on all pre-disease performance without restriction [Normal] : affect appropriate [de-identified] : breathing appeared unlabored [de-identified] : coloration appeared normal

## 2023-08-10 DIAGNOSIS — Z92.89 PERSONAL HISTORY OF OTHER MEDICAL TREATMENT: ICD-10-CM

## 2023-08-30 ENCOUNTER — RX RENEWAL (OUTPATIENT)
Age: 83
End: 2023-08-30

## 2023-09-11 ENCOUNTER — LABORATORY RESULT (OUTPATIENT)
Age: 83
End: 2023-09-11

## 2023-10-30 ENCOUNTER — LABORATORY RESULT (OUTPATIENT)
Age: 83
End: 2023-10-30

## 2023-10-30 LAB
ALBUMIN SERPL ELPH-MCNC: 4.3 G/DL
ALP BLD-CCNC: 73 U/L
ALT SERPL-CCNC: 16 U/L
AST SERPL-CCNC: 17 U/L
BILIRUB DIRECT SERPL-MCNC: 0.1 MG/DL
BILIRUB INDIRECT SERPL-MCNC: 0.2 MG/DL
BILIRUB SERPL-MCNC: 0.3 MG/DL
PROT SERPL-MCNC: 6.4 G/DL

## 2023-11-02 ENCOUNTER — OUTPATIENT (OUTPATIENT)
Dept: OUTPATIENT SERVICES | Facility: HOSPITAL | Age: 83
LOS: 1 days | Discharge: HOME | End: 2023-11-02

## 2023-11-02 DIAGNOSIS — D64.9 ANEMIA, UNSPECIFIED: ICD-10-CM

## 2023-11-03 ENCOUNTER — EMERGENCY (EMERGENCY)
Facility: HOSPITAL | Age: 83
LOS: 1 days | Discharge: HOME | End: 2023-11-03
Attending: STUDENT IN AN ORGANIZED HEALTH CARE EDUCATION/TRAINING PROGRAM | Admitting: STUDENT IN AN ORGANIZED HEALTH CARE EDUCATION/TRAINING PROGRAM
Payer: MEDICARE

## 2023-11-03 VITALS
HEIGHT: 64.5 IN | DIASTOLIC BLOOD PRESSURE: 67 MMHG | TEMPERATURE: 98 F | RESPIRATION RATE: 16 BRPM | HEART RATE: 94 BPM | SYSTOLIC BLOOD PRESSURE: 113 MMHG | OXYGEN SATURATION: 98 % | WEIGHT: 149.03 LBS

## 2023-11-03 PROCEDURE — 90715 TDAP VACCINE 7 YRS/> IM: CPT

## 2023-11-03 PROCEDURE — 29130 APPL FINGER SPLINT STATIC: CPT | Mod: F4

## 2023-11-03 PROCEDURE — 73130 X-RAY EXAM OF HAND: CPT | Mod: 26,LT

## 2023-11-03 PROCEDURE — 99284 EMERGENCY DEPT VISIT MOD MDM: CPT | Mod: 25

## 2023-11-03 PROCEDURE — 12001 RPR S/N/AX/GEN/TRNK 2.5CM/<: CPT | Mod: F4

## 2023-11-03 PROCEDURE — 73130 X-RAY EXAM OF HAND: CPT

## 2023-11-03 PROCEDURE — 12001 RPR S/N/AX/GEN/TRNK 2.5CM/<: CPT | Mod: 59

## 2023-11-03 PROCEDURE — 99283 EMERGENCY DEPT VISIT LOW MDM: CPT

## 2023-11-03 RX ORDER — TETANUS TOXOID, REDUCED DIPHTHERIA TOXOID AND ACELLULAR PERTUSSIS VACCINE, ADSORBED 5; 2.5; 8; 8; 2.5 [IU]/.5ML; [IU]/.5ML; UG/.5ML; UG/.5ML; UG/.5ML
0.5 SUSPENSION INTRAMUSCULAR ONCE
Refills: 0 | Status: COMPLETED | OUTPATIENT
Start: 2023-11-03 | End: 2023-11-03

## 2023-11-03 RX ADMIN — TETANUS TOXOID, REDUCED DIPHTHERIA TOXOID AND ACELLULAR PERTUSSIS VACCINE, ADSORBED 0.5 MILLILITER(S): 5; 2.5; 8; 8; 2.5 SUSPENSION INTRAMUSCULAR at 12:00

## 2023-11-03 RX ADMIN — Medication 300 MILLIGRAM(S): at 12:10

## 2023-11-03 NOTE — ED ADULT NURSE NOTE - OBJECTIVE STATEMENT
Patient came from home. Was bit by her own dog on her left pinky finger. On ASA and plavix. Denies any pain/discomfort at this time. Dog has all his vaccines. Patient not up to date on tetanus.

## 2023-11-03 NOTE — ED PROVIDER NOTE - PATIENT PORTAL LINK FT
You can access the FollowMyHealth Patient Portal offered by [facility] by registering at the following website: [email or web address]. By joining [employer]’s FollowMyHealth portal, you will also be able to view your health information using other applications (apps) compatible with our system.

## 2023-11-03 NOTE — ED PROVIDER NOTE - NSFOLLOWUPINSTRUCTIONS_ED_ALL_ED_FT
Animal Bite, Adult    Animal bites range from mild to serious. An animal bite can result in any of these injuries:  A scratch.  A deep, open cut.  A puncture of the skin.  A crush injury.  Tearing away of the skin or a body part.  A bone injury.  A small bite from a house pet is usually less serious than a bite from a stray or wild animal, such as a raccoon, ortiz, skunk, or bat. That is because stray and wild animals have a higher risk of carrying a serious infection called rabies, which can be passed to humans through a bite.    What increases the risk?  You are more likely to be bitten by an animal if:  You are around unfamiliar pets.  You disturb an animal when it is eating, sleeping, or caring for its babies.  You are outdoors in a place where small, wild animals roam freely.  What are the signs or symptoms?  Common symptoms of an animal bite include:  Pain.  Bleeding.  Swelling.  Bruising.  How is this diagnosed?  This condition may be diagnosed based on a physical exam and medical history. Your health care provider will examine your wound and ask for details about the animal and how the bite happened. You may also have tests, such as:  Blood tests to check for infection.  X-rays to check for damage to bones or joints.  Taking a fluid sample from your wound and checking it for infection (culture test).  How is this treated?  Treatment varies depending on the type of animal, where the bite is on your body, and your medical history. Treatment may include:  Caring for the wound. This often includes cleaning the wound, rinsing out (flushing) the wound with saline solution, and applying a bandage (dressing). In some cases, the wound may be closed with stitches (sutures), staples, skin glue, or adhesive strips.  Antibiotic medicine to prevent or treat infection. This medicine may be prescribed in pill or ointment form. If the bite area becomes infected, the medicine may be given through an IV.  A tetanus shot to prevent tetanus infection.  Rabies treatment to prevent rabies infection. This will be done if the animal could have rabies.  Surgery. This may be done if a bite gets infected or if there is damage that needs to be repaired.  Follow these instructions at home:  Wound care      Follow instructions from your health care provider about how to take care of your wound. Make sure you:  Wash your hands with soap and water before you change your dressing. If soap and water are not available, use hand .  Change your dressing as told by your health care provider.  Leave sutures, skin glue, or adhesive strips in place. These skin closures may need to stay in place for 2 weeks or longer. If adhesive strip edges start to loosen and curl up, you may trim the loose edges. Do not remove adhesive strips completely unless your health care provider tells you to do that.  Check your wound every day for signs of infection. Check for:  More redness, swelling, or pain.  More fluid or blood.  Warmth.  Pus or a bad smell.  Medicines     Take or apply over-the-counter and prescription medicines only as told by your health care provider.  If you were prescribed an antibiotic, take or apply it as told by your health care provider. Do not stop using the antibiotic even if your condition improves.  General instructions     Image   Keep the injured area raised (elevated) above the level of your heart while you are sitting or lying down, if this is possible.  If directed, put ice on the injured area.  Put ice in a plastic bag.  Place a towel between your skin and the bag.  Leave the ice on for 20 minutes, 2–3 times per day.  Keep all follow-up visits as told by your health care provider. This is important.  Contact a health care provider if:  You have more redness, swelling, or pain around your wound.  Your wound feels warm to the touch.  You have a fever or chills.  You have a general feeling of sickness (malaise).  You feel nauseous or you vomit.  You have pain that does not get better.  Get help right away if:  You have a red streak that leads away from your wound.  You have non-clear fluid or more blood coming from your wound.  There is pus or a bad smell coming from your wound.  You have trouble moving your injured area.  You have numbness or tingling that extends beyond the wound.  Summary  Animal bites can range from mild to serious. An animal bite can cause a scratch on the skin, a deep open cut, a puncture of the skin, a crush injury, tearing away of the skin or a body part, or a bone injury.  Your health care provider will examine your wound and ask for details about the animal and how the bite happened.  You may also have tests such as a blood test, X-ray, or testing of a fluid sample from your wound (culture test).  Treatment may include wound care, antibiotic medicine, a tetanus shot, and rabies treatment if the animal could have rabies.  This information is not intended to replace advice given to you by your health care provider. Make sure you discuss any questions you have with your health care provider.

## 2023-11-03 NOTE — ED PROVIDER NOTE - PHYSICAL EXAMINATION
VITAL SIGNS: I have reviewed nursing notes and confirm.  CONSTITUTIONAL: well-appearing, non-toxic, NAD  SKIN: Warm dry, normal skin turgor left 5th laceration 1.4 cm   HEAD: NCAT  EXT: Full ROM  PSYCH: Cooperative, appropriate.

## 2023-11-03 NOTE — ED PROVIDER NOTE - CARE PROVIDER_API CALL
Celia Curiel  Plastic Surgery  [address] [place] [address], [address]  [place] [address]  Phone: [phone]  Fax: [phone]  Follow Up Time: 1-3 Days

## 2023-11-03 NOTE — CHART NOTE - NSCHARTNOTEFT_GEN_A_CORE
Emergency Department Social Work Geriatric Initial Assessment    Cognitive Mental Status - Alert & Oriented  Primary Caregiver Information – None  Emergency Contact Information – Simona Roche, demetra [phone]  Primary Care Physician / Specialists - Dr. Everton Connor [phone]  Functional Status Prior to ED Visit - Independent   Family and Social Support –   Living Arrangement - Lives alone  Services Present on Admission –  No services   Assistive Devices (Durable Medical Equipment) – Patient is independent of DMEs  ADLs & Degree of [place] – Total independent  Barriers to obtain medications - No barriers to getting medication  Barriers to attend medical appointments - No barriers to attending medical appointments  ISAR Score –   Advanced Directives –   Follow up care – Plastic Surgeon  Discharge Transportation – Friend transport patient to home  Summary/Recommendations/Referrals -    Patient is independent with ADLs and ambulation.  Patient lives alone and seemed capable of managing home care.  Recommendation was made for a follow up visit with Plastic Surgeon.  Patient was assisted with an appointment with Dr. Curiel on 11/6/23 @ 9am.

## 2023-11-03 NOTE — ED ADULT NURSE NOTE - NSICDXPASTMEDICALHX_GEN_ALL_CORE_FT
PAST MEDICAL HISTORY:  Arthritis     HLD (hyperlipidemia)     Hypertension     TIA (transient ischemic attack)

## 2023-11-03 NOTE — ED PROVIDER NOTE - OBJECTIVE STATEMENT
83-year-old female presented to the ED with complaints of domesticated dog bite between her 2 dogs when both dogs were fighting patient was attempting to break a fight and got bit in the left fifth finger, complaining of localized pain to area, denies any other injuries unknown last Tdap.  Patient denies other complaints.

## 2023-11-03 NOTE — ED PROVIDER NOTE - CLINICAL SUMMARY MEDICAL DECISION MAKING FREE TEXT BOX
83-year-old female presented to the ED with complaints of domesticated dog bite between her 2 dogs when both dogs were fighting patient was attempting to break a fight and got bit in the left fifth finger, complaining of localized pain to area, denies any other injuries unknown last Tdap.  Patient denies other complaints.    laceration repair   f/u plastics Dr. Curiel   PO Clindamycin   return precautions  finger splint

## 2023-11-03 NOTE — ED ADULT TRIAGE NOTE - PAIN: PRESENCE, MLM
denies pain/discomfort (Rating = 0)
Patient had a Kumar catheter placed in the ER.  We'll try to get the Kumar discontinued and monitor for voiding.

## 2023-11-07 ENCOUNTER — APPOINTMENT (OUTPATIENT)
Dept: HEMATOLOGY ONCOLOGY | Facility: CLINIC | Age: 83
End: 2023-11-07
Payer: MEDICARE

## 2023-11-07 VITALS
WEIGHT: 148.15 LBS | HEART RATE: 83 BPM | DIASTOLIC BLOOD PRESSURE: 62 MMHG | RESPIRATION RATE: 16 BRPM | SYSTOLIC BLOOD PRESSURE: 118 MMHG | BODY MASS INDEX: 25.92 KG/M2 | HEIGHT: 63.23 IN | TEMPERATURE: 97.4 F | OXYGEN SATURATION: 97 %

## 2023-11-07 PROBLEM — I10 ESSENTIAL (PRIMARY) HYPERTENSION: Chronic | Status: ACTIVE | Noted: 2023-11-03

## 2023-11-07 PROBLEM — G45.9 TRANSIENT CEREBRAL ISCHEMIC ATTACK, UNSPECIFIED: Chronic | Status: ACTIVE | Noted: 2023-11-03

## 2023-11-07 PROBLEM — E78.5 HYPERLIPIDEMIA, UNSPECIFIED: Chronic | Status: ACTIVE | Noted: 2023-11-03

## 2023-11-07 PROCEDURE — 99214 OFFICE O/P EST MOD 30 MIN: CPT

## 2023-11-08 ENCOUNTER — EMERGENCY (EMERGENCY)
Facility: HOSPITAL | Age: 83
LOS: 1 days | Discharge: HOME | End: 2023-11-08
Attending: EMERGENCY MEDICINE | Admitting: EMERGENCY MEDICINE
Payer: MEDICARE

## 2023-11-08 VITALS
HEART RATE: 91 BPM | SYSTOLIC BLOOD PRESSURE: 121 MMHG | RESPIRATION RATE: 16 BRPM | OXYGEN SATURATION: 99 % | WEIGHT: 147.93 LBS | DIASTOLIC BLOOD PRESSURE: 65 MMHG | TEMPERATURE: 97 F | HEIGHT: 64.5 IN

## 2023-11-08 PROCEDURE — G0463: CPT

## 2023-11-08 PROCEDURE — L9995: CPT

## 2023-11-08 NOTE — ED ADULT NURSE NOTE - OBJECTIVE STATEMENT
Pt came from home requesting suture removal to the left hand. No c/o pain/discomfort, fevers/chills, purulent drainage or any other symptoms. Pt has a scheduled appt with the hand specialist tomorrow at 8am.

## 2023-11-08 NOTE — ED PROVIDER NOTE - PATIENT PORTAL LINK FT
You can access the FollowMyHealth Patient Portal offered by [facility] by registering at the following website: [email or web address]. By joining [employer]’s FollowMyHealth portal, you will also be able to view your health information using other applications (apps) compatible with our system.

## 2023-11-08 NOTE — ED ADULT NURSE NOTE - FINAL NURSING ELECTRONIC SIGNATURE
08-Nov-2023 10:48 Ivermectin Counseling:  Patient instructed to take medication on an empty stomach with a full glass of water.  Patient informed of potential adverse effects including but not limited to nausea, diarrhea, dizziness, itching, and swelling of the extremities or lymph nodes.  The patient verbalized understanding of the proper use and possible adverse effects of ivermectin.  All of the patient's questions and concerns were addressed.

## 2023-11-08 NOTE — ED PROVIDER NOTE - NSFOLLOWUPINSTRUCTIONS_ED_ALL_ED_FT
Follow up with the Hand Specialist tomorrow as scheduled.   Worsening, continued or ANY new concerning symptoms return to the emergency department.
no bloating, no constipation, no diarrhea, no nausea and no vomiting.

## 2023-11-08 NOTE — ED ADULT NURSE NOTE - NSFALLHARMRISKINTERV_ED_ALL_ED

## 2023-11-08 NOTE — ED ADULT NURSE NOTE - NSFALLFACTORS_ED_ALL_ED
Varghese Lopez (MD)  Cardiovascular Disease; Internal Medicine  Cardiology [facility], [address] [place] [address]  Phone: [phone]  Fax: [phone]  Follow Up Time: 2 weeks  
Impaired gait

## 2023-11-08 NOTE — ED PROVIDER NOTE - CLINICAL SUMMARY MEDICAL DECISION MAKING FREE TEXT BOX
83-year-old female presents to the ED for suture removal.  She had a follow-up appointment with hand specialist on Monday however had to cancel due to a conflict of appointments.  Patient had a mammogram that she could not miss.  Patient rescheduled for tomorrow 8 AM.  No complaints.  Patient change her own dressings yesterday.  States there is no pain.  No fever.  Exam as stated.   Suture removal. Pt appt is confirmed for tomorrow at 8AM, Dr Hernandez.   Worsening, continued or ANY new concerning symptoms return to the emergency department.

## 2023-11-08 NOTE — ED PROVIDER NOTE - OBJECTIVE STATEMENT
83-year-old female presents to the ED for suture removal.  She had a follow-up appointment with hand specialist on Monday however had to cancel due to a conflict of appointments.  Patient had a mammogram that she could not miss.  Patient rescheduled for tomorrow 8 AM.  No complaints.  Patient change her own dressings yesterday.  States there is no pain.  No fever.

## 2023-11-08 NOTE — ED PROVIDER NOTE - PHYSICAL EXAMINATION
Right 5th digit with 2 sutures. Open mid portion of the finger, not fully closed. No secondary signs of infection at this time. Normal granulation tissue.

## 2023-11-09 ENCOUNTER — RX RENEWAL (OUTPATIENT)
Age: 83
End: 2023-11-09

## 2023-11-10 NOTE — CHART NOTE - NSCHARTNOTEFT_GEN_A_CORE
82 yo female presenting to [facility] ED on 11/3 for suture removal.  Patient was agreeable to, and is scheduled a follow up hand specialist appointment with Dr. CODEY Curiel on 11/9/23 @ 8:30 am. 84 yo female presenting to [facility] ED on 11/3 for suture removal.  Patient was agreeable to, and was scheduled a follow up hand specialist appointment with Dr. CODEY Curiel on 11/9/23 @ 8:30 am.

## 2023-11-13 NOTE — CHART NOTE - NSCHARTNOTEFT_GEN_A_CORE
83 y o female presenting to [facility] ED on 11/8 complaining of laceration.  SW made a follow up call to assist with scheduling plastic surgeon appointment.  Patient reported a scheduled appointment on 11/9 @ 8:30 am with Dr. Curiel.

## 2023-12-04 ENCOUNTER — LABORATORY RESULT (OUTPATIENT)
Age: 83
End: 2023-12-04

## 2023-12-05 LAB
BASOPHILS # BLD AUTO: 0 K/UL
BASOPHILS NFR BLD AUTO: 0 %
EOSINOPHIL # BLD AUTO: 0.19 K/UL
EOSINOPHIL NFR BLD AUTO: 2.6 %
HCT VFR BLD CALC: 30.8 %
HGB BLD-MCNC: 9.8 G/DL
LYMPHOCYTES # BLD AUTO: 0.81 K/UL
LYMPHOCYTES NFR BLD AUTO: 11.2 %
MAN DIFF?: NORMAL
MCHC RBC-ENTMCNC: 31.8 GM/DL
MCHC RBC-ENTMCNC: 35.5 PG
MCV RBC AUTO: 111.6 FL
MONOCYTES # BLD AUTO: 0.43 K/UL
MONOCYTES NFR BLD AUTO: 6 %
NEUTROPHILS # BLD AUTO: 5.81 K/UL
NEUTROPHILS NFR BLD AUTO: 80.2 %
PLATELET # BLD AUTO: 477 K/UL
RBC # BLD: 2.76 M/UL
RBC # FLD: 15.3 %
WBC # FLD AUTO: 7.24 K/UL

## 2024-01-02 ENCOUNTER — LABORATORY RESULT (OUTPATIENT)
Age: 84
End: 2024-01-02

## 2024-01-02 LAB
BASOPHILS # BLD AUTO: 0.07 K/UL
BASOPHILS NFR BLD AUTO: 0.9 %
EOSINOPHIL # BLD AUTO: 0.33 K/UL
EOSINOPHIL NFR BLD AUTO: 4.3 %
HCT VFR BLD CALC: 31.9 %
HGB BLD-MCNC: 10.1 G/DL
LYMPHOCYTES # BLD AUTO: 1.32 K/UL
LYMPHOCYTES NFR BLD AUTO: 17.4 %
MAN DIFF?: NORMAL
MCHC RBC-ENTMCNC: 31.7 GM/DL
MCHC RBC-ENTMCNC: 35.4 PG
MCV RBC AUTO: 111.9 FL
MONOCYTES # BLD AUTO: 0.2 K/UL
MONOCYTES NFR BLD AUTO: 2.6 %
NEUTROPHILS # BLD AUTO: 5.41 K/UL
NEUTROPHILS NFR BLD AUTO: 71.3 %
PLATELET # BLD AUTO: 503 K/UL
RBC # BLD: 2.85 M/UL
RBC # FLD: 14.3 %
WBC # FLD AUTO: 7.59 K/UL

## 2024-02-04 ENCOUNTER — RX RENEWAL (OUTPATIENT)
Age: 84
End: 2024-02-04

## 2024-02-29 ENCOUNTER — OUTPATIENT (OUTPATIENT)
Dept: OUTPATIENT SERVICES | Facility: HOSPITAL | Age: 84
LOS: 1 days | Discharge: HOME | End: 2024-02-29

## 2024-02-29 DIAGNOSIS — D64.9 ANEMIA, UNSPECIFIED: ICD-10-CM

## 2024-03-01 ENCOUNTER — LABORATORY RESULT (OUTPATIENT)
Age: 84
End: 2024-03-01

## 2024-03-01 PROBLEM — D64.9 ANEMIA: Status: ACTIVE | Noted: 2020-05-27

## 2024-03-01 PROBLEM — R79.89 ELEVATED HOMOCYSTEINE: Status: ACTIVE | Noted: 2020-06-30

## 2024-03-01 PROBLEM — D75.839 THROMBOCYTOSIS: Status: ACTIVE | Noted: 2020-01-06

## 2024-03-01 NOTE — PHYSICAL EXAM
[Fully active, able to carry on all pre-disease performance without restriction] : Status 0 - Fully active, able to carry on all pre-disease performance without restriction [de-identified] : left breast without dominant mass or nipple discharge; right reconstructed breast without palpable abnormality [Normal] : affect appropriate

## 2024-03-04 ENCOUNTER — APPOINTMENT (OUTPATIENT)
Dept: HEMATOLOGY ONCOLOGY | Facility: CLINIC | Age: 84
End: 2024-03-04
Payer: MEDICARE

## 2024-03-04 VITALS
WEIGHT: 145.48 LBS | HEART RATE: 84 BPM | SYSTOLIC BLOOD PRESSURE: 117 MMHG | DIASTOLIC BLOOD PRESSURE: 63 MMHG | RESPIRATION RATE: 16 BRPM | TEMPERATURE: 96.4 F | OXYGEN SATURATION: 98 % | BODY MASS INDEX: 25.59 KG/M2

## 2024-03-04 DIAGNOSIS — C50.912 MALIGNANT NEOPLASM OF UNSPECIFIED SITE OF LEFT FEMALE BREAST: ICD-10-CM

## 2024-03-04 DIAGNOSIS — R79.89 OTHER SPECIFIED ABNORMAL FINDINGS OF BLOOD CHEMISTRY: ICD-10-CM

## 2024-03-04 DIAGNOSIS — D75.839 THROMBOCYTOSIS, UNSPECIFIED: ICD-10-CM

## 2024-03-04 DIAGNOSIS — D64.9 ANEMIA, UNSPECIFIED: ICD-10-CM

## 2024-03-04 PROCEDURE — 99214 OFFICE O/P EST MOD 30 MIN: CPT

## 2024-03-04 NOTE — HISTORY OF PRESENT ILLNESS
[Disease: _____________________] : Disease: [unfilled] [T: ___] : T[unfilled] [N: ___] : N[unfilled] [M: ___] : M[unfilled] [AJCC Stage: ____] : AJCC Stage: [unfilled] [de-identified] : History of right breast cancer 1988-status post right mastectomy with saline implant reconstruction.\par  History of left breast cancer 7/2015-Stage IA, ER positive/[place] positive/HER-2 negative. 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 Bone marrow biopsy and bone marrow aspirate consistent with CALR-positive myeloproliferative neoplasm with main differential diagnosis including pre-fibrotic stage of primary MF and essential thrombocythemia.  Normal PDGFR FISH panel. Cytogenetics with normal female karyotype.\par  \par  10/2022-Had TIA.\par  12/2022-Agreed to start Hydrea.\par   [de-identified] : invasive ductal carcinoma [de-identified] : Note patient is on a lung cancer screening research protocol with [facility] for which she receives regular interval CT imaging of the chest.\par  \par  Surgeon: Was Dr. Trevin Coy-#[phone] [de-identified] : Dr. Serafin Saab[facility] P[facility]. Most of [place]. Has next mammogram in November. Neurologist is Dr. Moran ([facility]). Remains on plavix and baby aspirin. No current c/o CP, SOB, fevers, cough. No new breast pain or lumps. No c/o pruritis. No abnormal bleeding reported.

## 2024-03-04 NOTE — ASSESSMENT
[FreeTextEntry1] : Lab work reviewed. 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 Though age greater than 60, BONNIE 2 negative with no history of venous thrombosis, and patient had been taking Plavix, so patient had wished for surveillance of interval follow-up lab work (and no Hydrea as had been recommended to her). 10/2022-Had TIA. 12/2022-Patient agreed to start Hydrea-adjustments continue based on interval lab work. --clinically stable at present. Interval f/u lab work ordered.  #2) h/o anemia-patient will continue with p.o. folic acid supplementation for h/o elevated homocysteine level. H/O increased creatinine may contribute to anemia as well as Hydrea. F/U with nephrologist for renal insufficiency.  #3) History of right breast cancer 1988-status post right mastectomy with saline implant reconstruction.  History of left breast cancer 7/2015-Stage IA, ER positive/[place] positive/HER-2 negative. Status post left breast lumpectomy/sentinel lymph node dissection-->RT--> Arimidex 10/2015-10/2022. 11/3/2022-Left Mammogram/breast US-Benign [id number] 2. Gets breast imaging scripts from GYN [facility]. --Clinically MARY. Surveillance.  Patient was given the opportunity to ask questions. Her questions have been answered to her apparent satisfaction. She is agreeable to recommended f/u and concurs with plans of care.  --> Folic acid 1 mg PO daily. Aspirin 81 mg PO daily. Hydrea 1000 mg p.o. q. [id number]-[id number] and 500 mg p.o. on remaining days of the week; CBC q 8 weeks; RTC 4 months.

## 2024-05-01 ENCOUNTER — LABORATORY RESULT (OUTPATIENT)
Age: 84
End: 2024-05-01

## 2024-05-01 ENCOUNTER — RX RENEWAL (OUTPATIENT)
Age: 84
End: 2024-05-01

## 2024-05-01 RX ORDER — HYDROXYUREA 500 MG/1
500 CAPSULE ORAL
Qty: 120 | Refills: 0 | Status: ACTIVE | COMMUNITY
Start: 2022-12-08 | End: 1900-01-01

## 2024-05-02 LAB
BASOPHILS # BLD AUTO: 0.13 K/UL
BASOPHILS NFR BLD AUTO: 1.8 %
EOSINOPHIL # BLD AUTO: 0.37 K/UL
EOSINOPHIL NFR BLD AUTO: 5.1 %
HCT VFR BLD CALC: 28.6 %
HGB BLD-MCNC: 9.3 G/DL
IMM GRANULOCYTES NFR BLD AUTO: 1.2 %
LYMPHOCYTES # BLD AUTO: 0.97 K/UL
LYMPHOCYTES NFR BLD AUTO: 13.4 %
MAN DIFF?: NORMAL
MCHC RBC-ENTMCNC: 32.5 GM/DL
MCHC RBC-ENTMCNC: 35.5 PG
MCV RBC AUTO: 109.2 FL
MONOCYTES # BLD AUTO: 0.84 K/UL
MONOCYTES NFR BLD AUTO: 11.6 %
NEUTROPHILS # BLD AUTO: 4.86 K/UL
NEUTROPHILS NFR BLD AUTO: 66.9 %
PLATELET # BLD AUTO: 433 K/UL
RBC # BLD: 2.62 M/UL
RBC # FLD: 13.3 %
WBC # FLD AUTO: 7.26 K/UL

## 2024-05-20 ENCOUNTER — APPOINTMENT (OUTPATIENT)
Dept: ORTHOPEDIC SURGERY | Facility: CLINIC | Age: 84
End: 2024-05-20
Payer: MEDICARE

## 2024-05-20 VITALS
WEIGHT: 139 LBS | HEART RATE: 66 BPM | SYSTOLIC BLOOD PRESSURE: 128 MMHG | BODY MASS INDEX: 24.32 KG/M2 | HEIGHT: 63.23 IN | DIASTOLIC BLOOD PRESSURE: 73 MMHG

## 2024-05-20 DIAGNOSIS — M25.551 PAIN IN RIGHT HIP: ICD-10-CM

## 2024-05-20 DIAGNOSIS — G89.29 PAIN IN RIGHT HIP: ICD-10-CM

## 2024-05-20 PROCEDURE — 99204 OFFICE O/P NEW MOD 45 MIN: CPT

## 2024-05-20 PROCEDURE — 73502 X-RAY EXAM HIP UNI 2-3 VIEWS: CPT

## 2024-05-21 NOTE — DISCUSSION/SUMMARY
[de-identified] : After a through history, full examination and review of x-rays. 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 Based upon the patients continued symptoms and failure to respond to conservative treatment. Which includes exercises, physical therapy, weight management, pain meds and NSAIDs. I have recommended a left total hip replacement for this patient. A long discussion took place with the patient describing what a total joint replacement is and what the procedure would entail.  I also explained the difference between a posterior hip replacement versus an anterior approached hip replacement; and which would be more advantageous for this patient.  It was decided that a left total hip replacement from the anterior approach would be performed.  A total hip model, similar to the implant that will be used during the operation was utilized to demonstrate and to discuss the various bearing surfaces of the implants. The benefits of surgery were discussed with the patient including the potential for improving his/her current clinical condition through operative intervention. Alternatives to surgical intervention including continued conservative management were also discussed in detail.   The hospitalization and post-operative care and rehabilitation were also discussed. The use of perioperative antibiotics and DVT prophylaxis were discussed. The risk, benefits and alternatives to a surgical intervention were discussed at length with the patient. The patient was also advised of risks related to the medical comorbidities and elevated body mass index (BMI).  A lengthy discussion took place to review the most common complications including but not limited to: deep vein thrombosis, pulmonary embolus, heart attack, stroke, infection, wound breakdown, numbness, damage to nerves, tendon, muscles, arteries or other blood vessels, death and other possible complications from anesthesia. The patient was told that we will take steps to minimize these risks by using sterile technique, antibiotics and DVT prophylaxis when appropriate and follow the patient postoperatively in the office setting to monitor progress. The possibility of recurrent pain, no improvement in pain and actual worsening of pain were also discussed with the patient.  The patient was advised of the goals, alternatives, risks and benefits of a 3 week course of Celebrex 200 mg BID to prevent Heterotopic Ossification seen in patients post total hip arthroplasty. If this is medically contraindicated the alternative would be a single dose (800cGy) radiation treatment to the hip implant site performed pre-operatively. A consultation with the Radiation Oncologist at [facility] will then be required.  The discharge plan of care focused on the patient going home following surgery.  I encouraged the patient to make the necessary arrangements to have someone stay with them when they are discharged home.  Following discharge, a home care nurse will visit the patient.  They will open your home care case and request home physical therapy services.  Home physical therapy will commence following discharge provided it is appropriate and covered by the health insurance benefit plan.   All questions were answered to the satisfaction of the patient. The treatment plan of care, as well as a model of a total hip equivalent to the one that will be used for their total joint replacement, was shared with the patient.  The patient agreed to the plan of care as well as the use of implants in their total joint replacement. The patient was advised that they will require a medical preoperative risk evaluation by their PCP. Further medical subspecialty clearances such as cardiac may be indicated if felt needed by their PCP. The patient participated in an interactive discussion of the THR implant planned for their surgery with questions answered, agreed with the treatment plan, and has decided to move forward with elective THR  as planned.   45 minutes were spent, face to face, in direct consultation with the patient. This includes reviewing the natural history of their Dx., eliciting the history, performing an orthopedic exam, review of the x-ray findings, forming a differential Dx and discussing all treatment options. This Includes both surgical and non-surgical treatments. I also reviewed all the risks and benefits of non-operative & operative Tx options, future impact into orthopedic functions/problems, activity restrictions both at home and at work, and all follow up requirements.

## 2024-05-21 NOTE — PHYSICAL EXAM
[Antalgic] : antalgic [LE] : Sensory: Intact in bilateral lower extremities [ALL] : dorsalis pedis, posterior tibial, femoral, popliteal, and radial 2+ and symmetric bilaterally [de-identified] : On physical examination of the left hip. The skin is clean, dry and intact with no evidence of infection, superficial or deep. There is no redness, swelling, heat or discharge noted. There is some pain and tenderness noted on the lateral aspect of the hip which radiates towards the left groin. This is worse with range of motion exercises especially flexion and internal rotation. The patient's range of motion is limited. The patient is able to fully extend the hip, flexion to at least 90, internal rotation of approximately 10, external rotation of approximately 45, with abduction and abduction of approximately 15-20. There is no evidence of limb length discrepancy. No evidence of any muscle atrophy. No evidence of any motor or sensory deficit. Patient is neurovascularly intact with good distal pulses and no calf tenderness.  Examination is compatible with osteoarthritis of the left hip [de-identified] : X-rays of the left hip reveals severe osteoarthritic changes.  This is seen in both views. There is significant joint space narrowing particularly at the superior pole with osteophyte formation, as well as areas of sclerosis. This is compatible with severe DJD of the left hip. There is no evidence of any fracture or dislocation.

## 2024-05-21 NOTE — HISTORY OF PRESENT ILLNESS
[de-identified] : Patient is a 83-year-old female who presents today for an evaluation regarding her left hip.  She states that she has had some discomfort which is progressively getting worse.  She noticed a stiffness sensation several months/years ago.  But was able to continue with her activities.  She states that she treated it conservatively.  However last year she did have a TIA for which she was seen and treated for.  This included a physical therapy/rehab.  She states that with this increased activities she has noticed that the pain in her hip had progressively gotten worse.  She describes it as a significant discomfort with stiffness.  Which is worse with any strenuous activities including standing walking and even going up and down stairs.  She states that she has tried all conservative measures over the past year.  Which includes therapy, weight management, medications for pain, anti-inflammatories, with no improvement.  As this pain and the stiffness has progressed.  She decided to seek medical attention.  She did see an orthopedist who explained that she does have osteoarthritic changes and may require a total hip replacement.  However due to her TIA he was hesitant to advise surgery at the present time.  She states that is no conservative measures have not improved her condition and she still feels significant discomfort.  She decided to seek a second opinion.

## 2024-06-03 ENCOUNTER — LABORATORY RESULT (OUTPATIENT)
Age: 84
End: 2024-06-03

## 2024-06-03 LAB
BASOPHILS # BLD AUTO: 0.1 K/UL
BASOPHILS NFR BLD AUTO: 1.4 %
EOSINOPHIL # BLD AUTO: 0.2 K/UL
EOSINOPHIL NFR BLD AUTO: 2.8 %
HCT VFR BLD CALC: 30.7 %
HGB BLD-MCNC: 9.5 G/DL
IMM GRANULOCYTES NFR BLD AUTO: 1.4 %
LYMPHOCYTES # BLD AUTO: 1.12 K/UL
LYMPHOCYTES NFR BLD AUTO: 15.6 %
MAN DIFF?: NORMAL
MCHC RBC-ENTMCNC: 30.9 GM/DL
MCHC RBC-ENTMCNC: 34.9 PG
MCV RBC AUTO: 112.9 FL
MONOCYTES # BLD AUTO: 0.62 K/UL
MONOCYTES NFR BLD AUTO: 8.7 %
NEUTROPHILS # BLD AUTO: 5.02 K/UL
NEUTROPHILS NFR BLD AUTO: 70.1 %
PLATELET # BLD AUTO: 558 K/UL
RBC # BLD: 2.72 M/UL
RBC # FLD: 14.3 %
WBC # FLD AUTO: 7.16 K/UL

## 2024-06-04 LAB
ALBUMIN SERPL ELPH-MCNC: 4.3 G/DL
ALP BLD-CCNC: 79 U/L
ALT SERPL-CCNC: 16 U/L
AST SERPL-CCNC: 20 U/L
BILIRUB DIRECT SERPL-MCNC: 0.1 MG/DL
BILIRUB INDIRECT SERPL-MCNC: 0.2 MG/DL
BILIRUB SERPL-MCNC: 0.3 MG/DL
PROT SERPL-MCNC: 6.6 G/DL

## 2024-06-05 LAB
CREAT SERPL-MCNC: 1.34 MG/DL
EGFR: 39 ML/MIN/1.73M2

## 2024-06-11 ENCOUNTER — APPOINTMENT (OUTPATIENT)
Dept: ORTHOPEDIC SURGERY | Facility: CLINIC | Age: 84
End: 2024-06-11
Payer: MEDICARE

## 2024-06-11 VITALS
HEART RATE: 86 BPM | DIASTOLIC BLOOD PRESSURE: 67 MMHG | HEIGHT: 64 IN | BODY MASS INDEX: 23.73 KG/M2 | WEIGHT: 139 LBS | SYSTOLIC BLOOD PRESSURE: 147 MMHG

## 2024-06-11 DIAGNOSIS — M16.12 UNILATERAL PRIMARY OSTEOARTHRITIS, LEFT HIP: ICD-10-CM

## 2024-06-11 PROCEDURE — 99203 OFFICE O/P NEW LOW 30 MIN: CPT

## 2024-06-11 NOTE — PHYSICAL EXAM
[Antalgic] : antalgic [LE] : Sensory: Intact in bilateral lower extremities [DP] : dorsalis pedis 2+ and symmetric bilaterally [Normal LLE] : Left Lower Extremity: No scars, rashes, lesions, ulcers, skin intact [Normal Touch] : sensation intact for touch [Normal] : no peripheral adenopathy appreciated [de-identified] : On physical examination of the left hip. The skin is clean, dry and intact with no evidence of infection, superficial or deep. There is no redness, swelling, heat or discharge noted. There is some pain and tenderness noted on the lateral aspect of the hip which radiates towards the left groin. This is worse with range of motion exercises especially flexion and internal rotation. The patient's range of motion is limited. The patient is able to fully extend the hip, flexion to at least 90, internal rotation of approximately 10, external rotation of approximately 45, with abduction and abduction of approximately 15-20. There is no evidence of limb length discrepancy. No evidence of any muscle atrophy. No evidence of any motor or sensory deficit. Patient is neurovascularly intact with good distal pulses and no calf tenderness. Examination is compatible with osteoarthritis of the left hip   [de-identified] : No images performed today.

## 2024-06-11 NOTE — END OF VISIT
Per Ly PA- pt to obtain thoracic 2 view, lumbar 2 view and lumbar flex ex xrays prior to determining if brace can be weaned. RN returned call to Flores and Coby to call back. [FreeTextEntry3] : I, Dr. Hunter, personally performed the evaluation and management (E/M) services for this established patient who presents today with (a) new problem(s)/exacerbation of (an) existing condition(s). That E/M includes conducting the clinically appropriate interval history &/or exam, assessing all new/exacerbated conditions, and establishing a new plan of care. Today, my SHANTA, [insert the name], was here to observe my evaluation and management service for this new problem/exacerbated condition and follow the plan of care established by me going forward.

## 2024-06-11 NOTE — DISCUSSION/SUMMARY
[de-identified] : The patient is an 83 year old woman with bone on bone arthritis of their Left Hip. Based upon the patients continued symptoms and failure to respond to 3 months of conservative treatment I have recommended a Left Total Hip Replacement for this patient. A long discussion took place with the patient describing what a total joint replacement is and what the procedure would entail. A Total Hip model, similar to the implant that will be used during the operation was utilized to demonstrate and to discuss the various bearing surfaces of the implants.   The benefits of surgery were discussed with the patient including the potential for improving their current clinical condition through operative intervention. Alternatives to surgical intervention including continued conservative management were also discussed in detail.   The hospitalization and post-operative care and rehabilitation were also discussed. The use of perioperative antibiotics and DVT prophylaxis were discussed. The risk, benefits and alternatives to a surgical intervention were discussed at length with the patient. The patient was also advised of risks related to the medical comorbidities and elevated body mass index (BMI). A lengthy discussion took place to review the most common complications including but not limited to: deep vein thrombosis, pulmonary embolus, heart attack, stroke, infection, wound breakdown, numbness, damage to nerves, tendon, muscles, arteries or other blood vessels, death and other possible complications from anesthesia. The patient was told that we will take steps to minimize these risks by using sterile technique, antibiotics and DVT prophylaxis when appropriate and follow the patient postoperatively in the office setting to monitor progress. The possibility of recurrent pain, no improvement in pain and actual worsening of pain were also discussed with the patient.   The nuances between posterior approach and anterior approach THR were reviewed in detail. Considering his clinical exam and X-Ray findings he does meet inclusion criteria for benefiting from ASI or Anterior Approach THR. The patient was advised that this is Dr. Hunter's primary surgical approach for THR unless contraindicated by above. Specific ASI complications especially vascular injury, neurologic injury-lateral femoral cutaneous nerve, and proximal femur fracture were reviewed in detail.   The need for 4 weeks of Anterior THR dislocation precautions with activity and the risk of THR implant dislocation at any point Post-Op was reviewed in detail.   The patient was advised of the goals, alternatives, risks and benefits of a 3 week course of Celebrex 200 mg BID utilized by Dr. Hunter in their practice to prevent Heterotopic Ossification seen in patients post total hip arthroplasty. If this is medically contraindicated the alternative would be a single dose (800cGy) radiation treatment to the hip implant site performed pre-operatively. A consultation with the Radiation Oncologist at [facility] will then be required.   The discharge plan of care focused on the patient going home following surgery. I encouraged the patient to make the necessary arrangements to have someone stay with them when they are discharged home. Following discharge, a home care nurse will visit the patient. They will open your home care case and request home physical therapy services. Home physical therapy   will commence following discharge provided it is appropriate and covered by their health insurance benefit plan.   All questions were answered to the satisfaction of the patient. The treatment plan of care, as well as a model of a total Hip equivalent to the one that will be used for their total joint replacement, was shared with the patient.   A DJO implant with Ceramic on Poly bearing surfaces is the implant I feel comfortable utilizing in my Primary THRs and the implant I am planning for their THR. If the patient wishes to utilize a different implant brand/type for their THR, they may obtain an additional surgical opinion from a Surgeon utilizing that brand implant.   The patient agreed to the plan of care as well as the use of implants in their total joint replacement.   The patient was advised that they will require a medical preoperative risk evaluation by their PCP. Further medical subspecialty clearances such as cardiac may be indicated if felt needed by their PCP.   The patient participated in an interactive discussion of the THR implant planned for their surgery with questions answered, agreed with the treatment plan, and has decided to move forward with elective THR as planned.

## 2024-06-11 NOTE — HISTORY OF PRESENT ILLNESS
[de-identified] : 83-year-old female presents for follow-up of the left hip.  She states that her pain is continuing.  She is here to discuss her surgical options.  She is tentatively scheduled for surgery on July 1.  She states that there were no new changes since the last time that she was seen.  Pain is continuing in the left hip and in the left groin. Denies fevers, chills, chest pain, calf pain, shortness of breath.

## 2024-06-12 ENCOUNTER — NON-APPOINTMENT (OUTPATIENT)
Age: 84
End: 2024-06-12

## 2024-06-16 ENCOUNTER — NON-APPOINTMENT (OUTPATIENT)
Age: 84
End: 2024-06-16

## 2024-06-17 ENCOUNTER — OUTPATIENT (OUTPATIENT)
Dept: OUTPATIENT SERVICES | Facility: HOSPITAL | Age: 84
LOS: 1 days | End: 2024-06-17
Payer: MEDICARE

## 2024-06-17 VITALS
DIASTOLIC BLOOD PRESSURE: 65 MMHG | OXYGEN SATURATION: 100 % | TEMPERATURE: 98 F | RESPIRATION RATE: 14 BRPM | WEIGHT: 137.57 LBS | SYSTOLIC BLOOD PRESSURE: 151 MMHG | HEIGHT: 64 IN | HEART RATE: 85 BPM

## 2024-06-17 DIAGNOSIS — Z98.890 OTHER SPECIFIED POSTPROCEDURAL STATES: Chronic | ICD-10-CM

## 2024-06-17 DIAGNOSIS — Z98.41 CATARACT EXTRACTION STATUS, RIGHT EYE: Chronic | ICD-10-CM

## 2024-06-17 DIAGNOSIS — Z90.49 ACQUIRED ABSENCE OF OTHER SPECIFIED PARTS OF DIGESTIVE TRACT: Chronic | ICD-10-CM

## 2024-06-17 DIAGNOSIS — Q35.9 CLEFT PALATE, UNSPECIFIED: Chronic | ICD-10-CM

## 2024-06-17 DIAGNOSIS — Z98.42 CATARACT EXTRACTION STATUS, LEFT EYE: Chronic | ICD-10-CM

## 2024-06-17 DIAGNOSIS — M16.12 UNILATERAL PRIMARY OSTEOARTHRITIS, LEFT HIP: ICD-10-CM

## 2024-06-17 DIAGNOSIS — Z98.82 BREAST IMPLANT STATUS: Chronic | ICD-10-CM

## 2024-06-17 DIAGNOSIS — Z90.11 ACQUIRED ABSENCE OF RIGHT BREAST AND NIPPLE: Chronic | ICD-10-CM

## 2024-06-17 DIAGNOSIS — Z01.818 ENCOUNTER FOR OTHER PREPROCEDURAL EXAMINATION: ICD-10-CM

## 2024-06-17 LAB
A1C WITH ESTIMATED AVERAGE GLUCOSE RESULT: 5.2 % — SIGNIFICANT CHANGE UP (ref 4–5.6)
ALBUMIN SERPL ELPH-MCNC: 4 G/DL — SIGNIFICANT CHANGE UP (ref 3.3–5)
ALP SERPL-CCNC: 75 U/L — SIGNIFICANT CHANGE UP (ref 30–120)
ALT FLD-CCNC: 30 U/L — SIGNIFICANT CHANGE UP (ref 10–60)
ANION GAP SERPL CALC-SCNC: 9 MMOL/L — SIGNIFICANT CHANGE UP (ref 5–17)
APTT BLD: 32.1 SEC — SIGNIFICANT CHANGE UP (ref 24.5–35.6)
AST SERPL-CCNC: 23 U/L — SIGNIFICANT CHANGE UP (ref 10–40)
BILIRUB SERPL-MCNC: 0.4 MG/DL — SIGNIFICANT CHANGE UP (ref 0.2–1.2)
BLD GP AB SCN SERPL QL: SIGNIFICANT CHANGE UP
BUN SERPL-MCNC: 44 MG/DL — HIGH (ref 7–23)
CALCIUM SERPL-MCNC: 10.5 MG/DL — SIGNIFICANT CHANGE UP (ref 8.4–10.5)
CHLORIDE SERPL-SCNC: 106 MMOL/L — SIGNIFICANT CHANGE UP (ref 96–108)
CO2 SERPL-SCNC: 26 MMOL/L — SIGNIFICANT CHANGE UP (ref 22–31)
CREAT SERPL-MCNC: 1.4 MG/DL — HIGH (ref 0.5–1.3)
EGFR: 37 ML/MIN/1.73M2 — LOW
ESTIMATED AVERAGE GLUCOSE: 103 MG/DL — SIGNIFICANT CHANGE UP (ref 68–114)
GLUCOSE SERPL-MCNC: 117 MG/DL — HIGH (ref 70–99)
HCT VFR BLD CALC: 31.7 % — LOW (ref 34.5–45)
HGB BLD-MCNC: 10.3 G/DL — LOW (ref 11.5–15.5)
INR BLD: 1 RATIO — SIGNIFICANT CHANGE UP (ref 0.85–1.18)
MCHC RBC-ENTMCNC: 32.5 GM/DL — SIGNIFICANT CHANGE UP (ref 32–36)
MCHC RBC-ENTMCNC: 36.1 PG — HIGH (ref 27–34)
MCV RBC AUTO: 111.2 FL — HIGH (ref 80–100)
MRSA PCR RESULT.: SIGNIFICANT CHANGE UP
NRBC # BLD: 0 /100 WBCS — SIGNIFICANT CHANGE UP (ref 0–0)
PLATELET # BLD AUTO: 559 K/UL — HIGH (ref 150–400)
POTASSIUM SERPL-MCNC: 5.4 MMOL/L — HIGH (ref 3.5–5.3)
POTASSIUM SERPL-SCNC: 5.4 MMOL/L — HIGH (ref 3.5–5.3)
PROT SERPL-MCNC: 7.3 G/DL — SIGNIFICANT CHANGE UP (ref 6–8.3)
PROTHROM AB SERPL-ACNC: 10.9 SEC — SIGNIFICANT CHANGE UP (ref 9.5–13)
RBC # BLD: 2.85 M/UL — LOW (ref 3.8–5.2)
RBC # FLD: 13.4 % — SIGNIFICANT CHANGE UP (ref 10.3–14.5)
S AUREUS DNA NOSE QL NAA+PROBE: SIGNIFICANT CHANGE UP
SODIUM SERPL-SCNC: 141 MMOL/L — SIGNIFICANT CHANGE UP (ref 135–145)
WBC # BLD: 7.4 K/UL — SIGNIFICANT CHANGE UP (ref 3.8–10.5)
WBC # FLD AUTO: 7.4 K/UL — SIGNIFICANT CHANGE UP (ref 3.8–10.5)

## 2024-06-17 PROCEDURE — 36415 COLL VENOUS BLD VENIPUNCTURE: CPT

## 2024-06-17 PROCEDURE — 93005 ELECTROCARDIOGRAM TRACING: CPT

## 2024-06-17 PROCEDURE — 80053 COMPREHEN METABOLIC PANEL: CPT

## 2024-06-17 PROCEDURE — G0463: CPT

## 2024-06-17 PROCEDURE — 87641 MR-STAPH DNA AMP PROBE: CPT

## 2024-06-17 PROCEDURE — 87640 STAPH A DNA AMP PROBE: CPT

## 2024-06-17 PROCEDURE — 86850 RBC ANTIBODY SCREEN: CPT

## 2024-06-17 PROCEDURE — 86900 BLOOD TYPING SEROLOGIC ABO: CPT

## 2024-06-17 PROCEDURE — 83036 HEMOGLOBIN GLYCOSYLATED A1C: CPT

## 2024-06-17 PROCEDURE — 85610 PROTHROMBIN TIME: CPT

## 2024-06-17 PROCEDURE — 93010 ELECTROCARDIOGRAM REPORT: CPT

## 2024-06-17 PROCEDURE — 85027 COMPLETE CBC AUTOMATED: CPT

## 2024-06-17 PROCEDURE — 86901 BLOOD TYPING SEROLOGIC RH(D): CPT

## 2024-06-17 PROCEDURE — 85730 THROMBOPLASTIN TIME PARTIAL: CPT

## 2024-06-17 RX ORDER — ASPIRIN/CALCIUM CARB/MAGNESIUM 324 MG
1 TABLET ORAL
Refills: 0 | DISCHARGE

## 2024-06-27 PROBLEM — Z86.2 PERSONAL HISTORY OF DISEASES OF THE BLOOD AND BLOOD-FORMING ORGANS AND CERTAIN DISORDERS INVOLVING THE IMMUNE MECHANISM: Chronic | Status: ACTIVE | Noted: 2024-06-17

## 2024-06-27 PROBLEM — J30.2 OTHER SEASONAL ALLERGIC RHINITIS: Chronic | Status: ACTIVE | Noted: 2024-06-17

## 2024-06-27 PROBLEM — R91.8 OTHER NONSPECIFIC ABNORMAL FINDING OF LUNG FIELD: Chronic | Status: ACTIVE | Noted: 2024-06-17

## 2024-06-27 PROBLEM — M51.36 OTHER INTERVERTEBRAL DISC DEGENERATION, LUMBAR REGION: Chronic | Status: ACTIVE | Noted: 2024-06-17

## 2024-06-27 PROBLEM — Z92.29 PERSONAL HISTORY OF OTHER DRUG THERAPY: Chronic | Status: ACTIVE | Noted: 2024-06-17

## 2024-06-27 PROBLEM — Z85.3 PERSONAL HISTORY OF MALIGNANT NEOPLASM OF BREAST: Chronic | Status: ACTIVE | Noted: 2024-06-17

## 2024-06-27 PROBLEM — Z87.09 PERSONAL HISTORY OF OTHER DISEASES OF THE RESPIRATORY SYSTEM: Chronic | Status: ACTIVE | Noted: 2024-06-17

## 2024-06-27 PROBLEM — H91.93 UNSPECIFIED HEARING LOSS, BILATERAL: Chronic | Status: ACTIVE | Noted: 2024-06-17

## 2024-06-27 PROBLEM — M19.90 UNSPECIFIED OSTEOARTHRITIS, UNSPECIFIED SITE: Chronic | Status: ACTIVE | Noted: 2024-06-17

## 2024-06-27 PROBLEM — M16.12 UNILATERAL PRIMARY OSTEOARTHRITIS, LEFT HIP: Chronic | Status: ACTIVE | Noted: 2024-06-17

## 2024-06-27 PROBLEM — M85.80 OTHER SPECIFIED DISORDERS OF BONE DENSITY AND STRUCTURE, UNSPECIFIED SITE: Chronic | Status: ACTIVE | Noted: 2024-06-17

## 2024-06-27 PROBLEM — Z87.19 PERSONAL HISTORY OF OTHER DISEASES OF THE DIGESTIVE SYSTEM: Chronic | Status: ACTIVE | Noted: 2024-06-17

## 2024-07-01 ENCOUNTER — INPATIENT (INPATIENT)
Facility: HOSPITAL | Age: 84
LOS: 0 days | Discharge: HOME | DRG: 554 | End: 2024-07-02
Attending: ORTHOPAEDIC SURGERY | Admitting: ORTHOPAEDIC SURGERY
Payer: MEDICARE

## 2024-07-01 ENCOUNTER — APPOINTMENT (OUTPATIENT)
Dept: HEMATOLOGY ONCOLOGY | Facility: CLINIC | Age: 84
End: 2024-07-01

## 2024-07-01 ENCOUNTER — APPOINTMENT (OUTPATIENT)
Dept: ORTHOPEDIC SURGERY | Facility: HOSPITAL | Age: 84
End: 2024-07-01

## 2024-07-01 ENCOUNTER — TRANSCRIPTION ENCOUNTER (OUTPATIENT)
Age: 84
End: 2024-07-01

## 2024-07-01 VITALS
SYSTOLIC BLOOD PRESSURE: 138 MMHG | DIASTOLIC BLOOD PRESSURE: 50 MMHG | RESPIRATION RATE: 17 BRPM | OXYGEN SATURATION: 100 % | HEART RATE: 98 BPM | HEIGHT: 64 IN | WEIGHT: 131.84 LBS | TEMPERATURE: 97 F

## 2024-07-01 DIAGNOSIS — Z98.82 BREAST IMPLANT STATUS: Chronic | ICD-10-CM

## 2024-07-01 DIAGNOSIS — Z98.890 OTHER SPECIFIED POSTPROCEDURAL STATES: Chronic | ICD-10-CM

## 2024-07-01 DIAGNOSIS — Z90.11 ACQUIRED ABSENCE OF RIGHT BREAST AND NIPPLE: Chronic | ICD-10-CM

## 2024-07-01 DIAGNOSIS — Z98.42 CATARACT EXTRACTION STATUS, LEFT EYE: Chronic | ICD-10-CM

## 2024-07-01 DIAGNOSIS — Q35.9 CLEFT PALATE, UNSPECIFIED: Chronic | ICD-10-CM

## 2024-07-01 DIAGNOSIS — Z98.41 CATARACT EXTRACTION STATUS, RIGHT EYE: Chronic | ICD-10-CM

## 2024-07-01 DIAGNOSIS — M16.12 UNILATERAL PRIMARY OSTEOARTHRITIS, LEFT HIP: ICD-10-CM

## 2024-07-01 DIAGNOSIS — Z90.49 ACQUIRED ABSENCE OF OTHER SPECIFIED PARTS OF DIGESTIVE TRACT: Chronic | ICD-10-CM

## 2024-07-01 LAB
APTT BLD: 32.3 SEC — SIGNIFICANT CHANGE UP (ref 24.5–35.6)
INR BLD: 1 RATIO — SIGNIFICANT CHANGE UP (ref 0.85–1.18)
POTASSIUM SERPL-MCNC: 4.4 MMOL/L — SIGNIFICANT CHANGE UP (ref 3.5–5.3)
POTASSIUM SERPL-SCNC: 4.4 MMOL/L — SIGNIFICANT CHANGE UP (ref 3.5–5.3)
PROTHROM AB SERPL-ACNC: 11.2 SEC — SIGNIFICANT CHANGE UP (ref 9.5–13)

## 2024-07-01 PROCEDURE — 27130 TOTAL HIP ARTHROPLASTY: CPT | Mod: LT

## 2024-07-01 PROCEDURE — 99222 1ST HOSP IP/OBS MODERATE 55: CPT

## 2024-07-01 DEVICE — FEM HD BIOLOX DELTA 28MM: Type: IMPLANTABLE DEVICE | Site: LEFT | Status: FUNCTIONAL

## 2024-07-01 DEVICE — IMPLANTABLE DEVICE: Type: IMPLANTABLE DEVICE | Site: LEFT | Status: FUNCTIONAL

## 2024-07-01 DEVICE — CUP ACET EMPOWR CLUSTER 50MM ALPHA E: Type: IMPLANTABLE DEVICE | Site: LEFT | Status: FUNCTIONAL

## 2024-07-01 DEVICE — PIN STEINMAN TROC 3/16X9IN STRL: Type: IMPLANTABLE DEVICE | Site: LEFT | Status: FUNCTIONAL

## 2024-07-01 DEVICE — SLEEVE BIOLOX DELTA OPTION NEUTRAL: Type: IMPLANTABLE DEVICE | Site: LEFT | Status: FUNCTIONAL

## 2024-07-01 DEVICE — SCREW ACET SZ 6.5X30MM: Type: IMPLANTABLE DEVICE | Site: LEFT | Status: FUNCTIONAL

## 2024-07-01 DEVICE — LINER ACET EMPOWR METAL DUAL MOBILITY 40 ALPHA E: Type: IMPLANTABLE DEVICE | Site: LEFT | Status: FUNCTIONAL

## 2024-07-01 DEVICE — BEARING EMPOWR DUAL MOBILITY 40 ALPHA E: Type: IMPLANTABLE DEVICE | Site: LEFT | Status: FUNCTIONAL

## 2024-07-01 DEVICE — BONE WAX 2.5GM: Type: IMPLANTABLE DEVICE | Site: LEFT | Status: FUNCTIONAL

## 2024-07-01 RX ORDER — HYDROXYUREA 500 MG
500 CAPSULE ORAL DAILY
Refills: 0 | Status: DISCONTINUED | OUTPATIENT
Start: 2024-07-01 | End: 2024-07-02

## 2024-07-01 RX ORDER — FLUTICASONE PROPIONATE 50 UG/1
1 SPRAY, METERED NASAL
Refills: 0 | Status: DISCONTINUED | OUTPATIENT
Start: 2024-07-01 | End: 2024-07-02

## 2024-07-01 RX ORDER — ACETAMINOPHEN 325 MG
1000 TABLET ORAL EVERY 8 HOURS
Refills: 0 | Status: DISCONTINUED | OUTPATIENT
Start: 2024-07-02 | End: 2024-07-02

## 2024-07-01 RX ORDER — OXYCODONE HYDROCHLORIDE 100 MG/5ML
10 SOLUTION ORAL
Refills: 0 | Status: DISCONTINUED | OUTPATIENT
Start: 2024-07-01 | End: 2024-07-02

## 2024-07-01 RX ORDER — OXYCODONE HYDROCHLORIDE 100 MG/5ML
5 SOLUTION ORAL ONCE
Refills: 0 | Status: DISCONTINUED | OUTPATIENT
Start: 2024-07-01 | End: 2024-07-01

## 2024-07-01 RX ORDER — ACETAMINOPHEN 325 MG
1000 TABLET ORAL EVERY 8 HOURS
Refills: 0 | Status: COMPLETED | OUTPATIENT
Start: 2024-07-02 | End: 2025-05-31

## 2024-07-01 RX ORDER — APIXABAN 5 MG/1
1 TABLET, FILM COATED ORAL
Qty: 56 | Refills: 0
Start: 2024-07-01 | End: 2024-07-28

## 2024-07-01 RX ORDER — ACETAMINOPHEN 325 MG
1000 TABLET ORAL ONCE
Refills: 0 | Status: COMPLETED | OUTPATIENT
Start: 2024-07-01 | End: 2024-07-01

## 2024-07-01 RX ORDER — BIOTIN/FOLIC AC/VIT BCOMP,C/ZN 3MG-0.8MG
0 TABLET ORAL
Refills: 0 | DISCHARGE

## 2024-07-01 RX ORDER — ATORVASTATIN CALCIUM 20 MG/1
20 TABLET, FILM COATED ORAL AT BEDTIME
Refills: 0 | Status: DISCONTINUED | OUTPATIENT
Start: 2024-07-01 | End: 2024-07-02

## 2024-07-01 RX ORDER — POLYETHYLENE GLYCOL 3350 1 G/G
17 POWDER ORAL AT BEDTIME
Refills: 0 | Status: DISCONTINUED | OUTPATIENT
Start: 2024-07-01 | End: 2024-07-02

## 2024-07-01 RX ORDER — CELECOXIB 100 MG/1
100 CAPSULE ORAL EVERY 12 HOURS
Refills: 0 | Status: DISCONTINUED | OUTPATIENT
Start: 2024-07-01 | End: 2024-07-01

## 2024-07-01 RX ORDER — DEXAMETHASONE 1 MG/1
8 TABLET ORAL ONCE
Refills: 0 | Status: COMPLETED | OUTPATIENT
Start: 2024-07-02 | End: 2024-07-02

## 2024-07-01 RX ORDER — PANTOPRAZOLE SODIUM 40 MG/10ML
40 INJECTION, POWDER, FOR SOLUTION INTRAVENOUS
Refills: 0 | Status: DISCONTINUED | OUTPATIENT
Start: 2024-07-01 | End: 2024-07-02

## 2024-07-01 RX ORDER — TRANEXAMIC ACID 100 MG/ML
620 INJECTION, SOLUTION INTRAVENOUS ONCE
Refills: 0 | Status: COMPLETED | OUTPATIENT
Start: 2024-07-01 | End: 2024-07-01

## 2024-07-01 RX ORDER — DEXTROSE MONOHYDRATE AND SODIUM CHLORIDE 5; .3 G/100ML; G/100ML
1000 INJECTION, SOLUTION INTRAVENOUS
Refills: 0 | Status: DISCONTINUED | OUTPATIENT
Start: 2024-07-01 | End: 2024-07-01

## 2024-07-01 RX ORDER — ONDANSETRON HYDROCHLORIDE 2 MG/ML
4 INJECTION INTRAMUSCULAR; INTRAVENOUS ONCE
Refills: 0 | Status: DISCONTINUED | OUTPATIENT
Start: 2024-07-01 | End: 2024-07-01

## 2024-07-01 RX ORDER — CEFAZOLIN 10 G/1
2000 INJECTION, POWDER, FOR SOLUTION INTRAVENOUS ONCE
Refills: 0 | Status: COMPLETED | OUTPATIENT
Start: 2024-07-01 | End: 2024-07-01

## 2024-07-01 RX ORDER — SODIUM CHLORIDE 0.9 % (FLUSH) 0.9 %
500 SYRINGE (ML) INJECTION ONCE
Refills: 0 | Status: COMPLETED | OUTPATIENT
Start: 2024-07-01 | End: 2024-07-01

## 2024-07-01 RX ORDER — APIXABAN 5 MG/1
2.5 TABLET, FILM COATED ORAL EVERY 12 HOURS
Refills: 0 | Status: DISCONTINUED | OUTPATIENT
Start: 2024-07-02 | End: 2024-07-02

## 2024-07-01 RX ORDER — OXYCODONE HYDROCHLORIDE 100 MG/5ML
5 SOLUTION ORAL
Refills: 0 | Status: DISCONTINUED | OUTPATIENT
Start: 2024-07-01 | End: 2024-07-02

## 2024-07-01 RX ORDER — BISACODYL 5 MG
10 TABLET, DELAYED RELEASE (ENTERIC COATED) ORAL ONCE
Refills: 0 | Status: DISCONTINUED | OUTPATIENT
Start: 2024-07-03 | End: 2024-07-02

## 2024-07-01 RX ORDER — ATORVASTATIN CALCIUM 20 MG/1
1 TABLET, FILM COATED ORAL
Refills: 0 | DISCHARGE

## 2024-07-01 RX ORDER — FLUTICASONE PROPIONATE 50 UG/1
1 SPRAY, METERED NASAL
Refills: 0 | DISCHARGE

## 2024-07-01 RX ORDER — APREPITANT 125MG-80MG
40 KIT ORAL ONCE
Refills: 0 | Status: COMPLETED | OUTPATIENT
Start: 2024-07-01 | End: 2024-07-01

## 2024-07-01 RX ORDER — CEFAZOLIN 10 G/1
2000 INJECTION, POWDER, FOR SOLUTION INTRAVENOUS EVERY 8 HOURS
Refills: 0 | Status: COMPLETED | OUTPATIENT
Start: 2024-07-01 | End: 2024-07-02

## 2024-07-01 RX ORDER — HYDROMORPHONE HCL 0.2 MG/ML
0.25 INJECTION, SOLUTION INTRAVENOUS
Refills: 0 | Status: DISCONTINUED | OUTPATIENT
Start: 2024-07-01 | End: 2024-07-01

## 2024-07-01 RX ORDER — ONDANSETRON HYDROCHLORIDE 2 MG/ML
4 INJECTION INTRAMUSCULAR; INTRAVENOUS EVERY 6 HOURS
Refills: 0 | Status: DISCONTINUED | OUTPATIENT
Start: 2024-07-01 | End: 2024-07-02

## 2024-07-01 RX ORDER — ASPIRIN 325 MG/1
0 TABLET, FILM COATED ORAL
Refills: 0 | DISCHARGE

## 2024-07-01 RX ORDER — PANTOPRAZOLE SODIUM 40 MG/10ML
1 INJECTION, POWDER, FOR SOLUTION INTRAVENOUS
Qty: 30 | Refills: 0
Start: 2024-07-01

## 2024-07-01 RX ORDER — FOLIC ACID
1 POWDER (GRAM) MISCELLANEOUS
Refills: 0 | DISCHARGE

## 2024-07-01 RX ORDER — ACETAMINOPHEN 325 MG
1000 TABLET ORAL EVERY 6 HOURS
Refills: 0 | Status: COMPLETED | OUTPATIENT
Start: 2024-07-01 | End: 2024-07-01

## 2024-07-01 RX ORDER — SENNOSIDES 8.6 MG
2 TABLET ORAL AT BEDTIME
Refills: 0 | Status: DISCONTINUED | OUTPATIENT
Start: 2024-07-01 | End: 2024-07-02

## 2024-07-01 RX ORDER — HYDROMORPHONE HCL 0.2 MG/ML
0.5 INJECTION, SOLUTION INTRAVENOUS
Refills: 0 | Status: DISCONTINUED | OUTPATIENT
Start: 2024-07-01 | End: 2024-07-02

## 2024-07-01 RX ORDER — OLMESARTAN MEDOXOMIL 5 MG/1
1 TABLET, FILM COATED ORAL
Refills: 0 | DISCHARGE

## 2024-07-01 RX ORDER — DEXTROSE MONOHYDRATE AND SODIUM CHLORIDE 5; .3 G/100ML; G/100ML
1000 INJECTION, SOLUTION INTRAVENOUS
Refills: 0 | Status: DISCONTINUED | OUTPATIENT
Start: 2024-07-02 | End: 2024-07-02

## 2024-07-01 RX ORDER — CALCIUM CARBONATE 500(1250)
2 TABLET,CHEWABLE ORAL
Refills: 0 | DISCHARGE

## 2024-07-01 RX ORDER — HYDROXYUREA 500 MG
15 CAPSULE ORAL
Refills: 0 | DISCHARGE

## 2024-07-01 RX ORDER — ASPIRIN 325 MG/1
81 TABLET, FILM COATED ORAL DAILY
Refills: 0 | Status: DISCONTINUED | OUTPATIENT
Start: 2024-07-02 | End: 2024-07-02

## 2024-07-01 RX ADMIN — Medication 500 MILLILITER(S): at 18:43

## 2024-07-01 RX ADMIN — Medication 500 MILLILITER(S): at 13:42

## 2024-07-01 RX ADMIN — DEXTROSE MONOHYDRATE AND SODIUM CHLORIDE 75 MILLILITER(S): 5; .3 INJECTION, SOLUTION INTRAVENOUS at 12:46

## 2024-07-01 RX ADMIN — CEFAZOLIN 100 MILLIGRAM(S): 10 INJECTION, POWDER, FOR SOLUTION INTRAVENOUS at 18:42

## 2024-07-01 RX ADMIN — HYDROMORPHONE HCL 0.25 MILLIGRAM(S): 0.2 INJECTION, SOLUTION INTRAVENOUS at 13:10

## 2024-07-01 RX ADMIN — FLUTICASONE PROPIONATE 1 SPRAY(S): 50 SPRAY, METERED NASAL at 18:56

## 2024-07-01 RX ADMIN — Medication 400 MILLIGRAM(S): at 23:33

## 2024-07-01 RX ADMIN — Medication 1 APPLICATION(S): at 09:05

## 2024-07-01 RX ADMIN — APREPITANT 40 MILLIGRAM(S): KIT at 09:05

## 2024-07-01 RX ADMIN — HYDROMORPHONE HCL 0.25 MILLIGRAM(S): 0.2 INJECTION, SOLUTION INTRAVENOUS at 12:46

## 2024-07-01 RX ADMIN — Medication 1000 MILLIGRAM(S): at 18:35

## 2024-07-01 RX ADMIN — Medication 400 MILLIGRAM(S): at 17:26

## 2024-07-01 RX ADMIN — ATORVASTATIN CALCIUM 20 MILLIGRAM(S): 20 TABLET, FILM COATED ORAL at 22:01

## 2024-07-02 ENCOUNTER — TRANSCRIPTION ENCOUNTER (OUTPATIENT)
Age: 84
End: 2024-07-02

## 2024-07-02 VITALS
TEMPERATURE: 98 F | HEART RATE: 88 BPM | DIASTOLIC BLOOD PRESSURE: 78 MMHG | RESPIRATION RATE: 20 BRPM | OXYGEN SATURATION: 97 % | SYSTOLIC BLOOD PRESSURE: 115 MMHG

## 2024-07-02 LAB
ANION GAP SERPL CALC-SCNC: 11 MMOL/L — SIGNIFICANT CHANGE UP (ref 5–17)
BUN SERPL-MCNC: 33 MG/DL — HIGH (ref 7–23)
CALCIUM SERPL-MCNC: 8.4 MG/DL — SIGNIFICANT CHANGE UP (ref 8.4–10.5)
CHLORIDE SERPL-SCNC: 108 MMOL/L — SIGNIFICANT CHANGE UP (ref 96–108)
CO2 SERPL-SCNC: 22 MMOL/L — SIGNIFICANT CHANGE UP (ref 22–31)
CREAT SERPL-MCNC: 1.55 MG/DL — HIGH (ref 0.5–1.3)
EGFR: 33 ML/MIN/1.73M2 — LOW
GLUCOSE SERPL-MCNC: 123 MG/DL — HIGH (ref 70–99)
HCT VFR BLD CALC: 24.2 % — LOW (ref 34.5–45)
HGB BLD-MCNC: 7.8 G/DL — LOW (ref 11.5–15.5)
MCHC RBC-ENTMCNC: 32.2 GM/DL — SIGNIFICANT CHANGE UP (ref 32–36)
MCHC RBC-ENTMCNC: 34.5 PG — HIGH (ref 27–34)
MCV RBC AUTO: 107.1 FL — HIGH (ref 80–100)
NRBC # BLD: 0 /100 WBCS — SIGNIFICANT CHANGE UP (ref 0–0)
PLATELET # BLD AUTO: 404 K/UL — HIGH (ref 150–400)
POTASSIUM SERPL-MCNC: 4.1 MMOL/L — SIGNIFICANT CHANGE UP (ref 3.5–5.3)
POTASSIUM SERPL-SCNC: 4.1 MMOL/L — SIGNIFICANT CHANGE UP (ref 3.5–5.3)
RBC # BLD: 2.26 M/UL — LOW (ref 3.8–5.2)
RBC # FLD: 13 % — SIGNIFICANT CHANGE UP (ref 10.3–14.5)
SODIUM SERPL-SCNC: 141 MMOL/L — SIGNIFICANT CHANGE UP (ref 135–145)
WBC # BLD: 9.21 K/UL — SIGNIFICANT CHANGE UP (ref 3.8–10.5)
WBC # FLD AUTO: 9.21 K/UL — SIGNIFICANT CHANGE UP (ref 3.8–10.5)

## 2024-07-02 PROCEDURE — 97161 PT EVAL LOW COMPLEX 20 MIN: CPT

## 2024-07-02 PROCEDURE — 97110 THERAPEUTIC EXERCISES: CPT

## 2024-07-02 PROCEDURE — 97530 THERAPEUTIC ACTIVITIES: CPT

## 2024-07-02 PROCEDURE — 94640 AIRWAY INHALATION TREATMENT: CPT

## 2024-07-02 PROCEDURE — 80048 BASIC METABOLIC PNL TOTAL CA: CPT

## 2024-07-02 PROCEDURE — 84132 ASSAY OF SERUM POTASSIUM: CPT

## 2024-07-02 PROCEDURE — 85610 PROTHROMBIN TIME: CPT

## 2024-07-02 PROCEDURE — C1889: CPT

## 2024-07-02 PROCEDURE — 99232 SBSQ HOSP IP/OBS MODERATE 35: CPT

## 2024-07-02 PROCEDURE — C1776: CPT

## 2024-07-02 PROCEDURE — 36415 COLL VENOUS BLD VENIPUNCTURE: CPT

## 2024-07-02 PROCEDURE — 97535 SELF CARE MNGMENT TRAINING: CPT

## 2024-07-02 PROCEDURE — 97116 GAIT TRAINING THERAPY: CPT

## 2024-07-02 PROCEDURE — 85027 COMPLETE CBC AUTOMATED: CPT

## 2024-07-02 PROCEDURE — 27130 TOTAL HIP ARTHROPLASTY: CPT

## 2024-07-02 PROCEDURE — 76000 FLUOROSCOPY <1 HR PHYS/QHP: CPT

## 2024-07-02 PROCEDURE — 94664 DEMO&/EVAL PT USE INHALER: CPT

## 2024-07-02 PROCEDURE — C1713: CPT

## 2024-07-02 PROCEDURE — 85730 THROMBOPLASTIN TIME PARTIAL: CPT

## 2024-07-02 PROCEDURE — C9399: CPT

## 2024-07-02 RX ORDER — ACETAMINOPHEN 325 MG
2 TABLET ORAL
Qty: 0 | Refills: 0 | DISCHARGE
Start: 2024-07-02

## 2024-07-02 RX ORDER — SENNOSIDES 8.6 MG
2 TABLET ORAL
Qty: 0 | Refills: 0 | DISCHARGE
Start: 2024-07-02

## 2024-07-02 RX ORDER — CLOPIDOGREL BISULFATE 75 MG/1
1 TABLET, FILM COATED ORAL
Qty: 0 | Refills: 0 | DISCHARGE

## 2024-07-02 RX ORDER — POLYETHYLENE GLYCOL 3350 1 G/G
17 POWDER ORAL
Qty: 0 | Refills: 0 | DISCHARGE
Start: 2024-07-02

## 2024-07-02 RX ORDER — OXYCODONE HYDROCHLORIDE 100 MG/5ML
1 SOLUTION ORAL
Qty: 42 | Refills: 0
Start: 2024-07-02 | End: 2024-07-08

## 2024-07-02 RX ADMIN — CEFAZOLIN 100 MILLIGRAM(S): 10 INJECTION, POWDER, FOR SOLUTION INTRAVENOUS at 01:47

## 2024-07-02 RX ADMIN — FLUTICASONE PROPIONATE 1 SPRAY(S): 50 SPRAY, METERED NASAL at 05:42

## 2024-07-02 RX ADMIN — Medication 1000 MILLIGRAM(S): at 14:20

## 2024-07-02 RX ADMIN — DEXAMETHASONE 101.6 MILLIGRAM(S): 1 TABLET ORAL at 05:41

## 2024-07-02 RX ADMIN — ASPIRIN 81 MILLIGRAM(S): 325 TABLET, FILM COATED ORAL at 05:42

## 2024-07-02 RX ADMIN — Medication 1000 MILLIGRAM(S): at 05:42

## 2024-07-02 RX ADMIN — DEXTROSE MONOHYDRATE AND SODIUM CHLORIDE 100 MILLILITER(S): 5; .3 INJECTION, SOLUTION INTRAVENOUS at 05:41

## 2024-07-02 RX ADMIN — Medication 1000 MILLIGRAM(S): at 06:10

## 2024-07-02 RX ADMIN — PANTOPRAZOLE SODIUM 40 MILLIGRAM(S): 40 INJECTION, POWDER, FOR SOLUTION INTRAVENOUS at 05:42

## 2024-07-02 RX ADMIN — Medication 500 MILLIGRAM(S): at 08:59

## 2024-07-02 RX ADMIN — APIXABAN 2.5 MILLIGRAM(S): 5 TABLET, FILM COATED ORAL at 09:00

## 2024-07-02 RX ADMIN — Medication 1000 MILLIGRAM(S): at 00:03

## 2024-07-08 ENCOUNTER — NON-APPOINTMENT (OUTPATIENT)
Age: 84
End: 2024-07-08

## 2024-07-10 ENCOUNTER — NON-APPOINTMENT (OUTPATIENT)
Age: 84
End: 2024-07-10

## 2024-07-15 ENCOUNTER — APPOINTMENT (OUTPATIENT)
Dept: ORTHOPEDIC SURGERY | Facility: CLINIC | Age: 84
End: 2024-07-15
Payer: MEDICARE

## 2024-07-15 VITALS
WEIGHT: 139 LBS | HEIGHT: 64 IN | BODY MASS INDEX: 23.73 KG/M2 | DIASTOLIC BLOOD PRESSURE: 66 MMHG | SYSTOLIC BLOOD PRESSURE: 164 MMHG | HEART RATE: 87 BPM

## 2024-07-15 DIAGNOSIS — Z96.642 PRESENCE OF LEFT ARTIFICIAL HIP JOINT: ICD-10-CM

## 2024-07-15 PROCEDURE — 99024 POSTOP FOLLOW-UP VISIT: CPT

## 2024-07-15 PROCEDURE — 73502 X-RAY EXAM HIP UNI 2-3 VIEWS: CPT | Mod: LT

## 2024-07-16 ENCOUNTER — TRANSCRIPTION ENCOUNTER (OUTPATIENT)
Age: 84
End: 2024-07-16

## 2024-07-19 ENCOUNTER — TRANSCRIPTION ENCOUNTER (OUTPATIENT)
Age: 84
End: 2024-07-19

## 2024-07-24 ENCOUNTER — LABORATORY RESULT (OUTPATIENT)
Age: 84
End: 2024-07-24

## 2024-07-24 LAB
BASOPHILS # BLD AUTO: 0.09 K/UL
BASOPHILS NFR BLD AUTO: 1.2 %
EOSINOPHIL # BLD AUTO: 0.16 K/UL
EOSINOPHIL NFR BLD AUTO: 2.1 %
FERRITIN SERPL-MCNC: 317 NG/ML
FOLATE SERPL-MCNC: >20 NG/ML
HCT VFR BLD CALC: 28.9 %
HGB BLD-MCNC: 8.8 G/DL
IMM GRANULOCYTES NFR BLD AUTO: 2.1 %
IRON SATN MFR SERPL: 19 %
IRON SERPL-MCNC: 50 UG/DL
LYMPHOCYTES # BLD AUTO: 1.14 K/UL
LYMPHOCYTES NFR BLD AUTO: 15.1 %
MAN DIFF?: NORMAL
MCHC RBC-ENTMCNC: 30.4 GM/DL
MCHC RBC-ENTMCNC: 34.4 PG
MCV RBC AUTO: 112.9 FL
MONOCYTES # BLD AUTO: 1.03 K/UL
MONOCYTES NFR BLD AUTO: 13.7 %
NEUTROPHILS # BLD AUTO: 4.96 K/UL
NEUTROPHILS NFR BLD AUTO: 65.8 %
PLATELET # BLD AUTO: 608 K/UL
RBC # BLD: 2.56 M/UL
RBC # FLD: 13.5 %
TIBC SERPL-MCNC: 259 UG/DL
UIBC SERPL-MCNC: 210 UG/DL
VIT B12 SERPL-MCNC: >2000 PG/ML
WBC # FLD AUTO: 7.54 K/UL

## 2024-07-26 ENCOUNTER — TRANSCRIPTION ENCOUNTER (OUTPATIENT)
Age: 84
End: 2024-07-26

## 2024-07-31 ENCOUNTER — LABORATORY RESULT (OUTPATIENT)
Age: 84
End: 2024-07-31

## 2024-07-31 LAB
BASOPHILS # BLD AUTO: 0.14 K/UL
BASOPHILS NFR BLD AUTO: 1.4 %
EOSINOPHIL # BLD AUTO: 0.16 K/UL
EOSINOPHIL NFR BLD AUTO: 1.5 %
HCT VFR BLD CALC: 32.6 %
HGB BLD-MCNC: 9.9 G/DL
IMM GRANULOCYTES NFR BLD AUTO: 4.5 %
LYMPHOCYTES # BLD AUTO: 1.38 K/UL
LYMPHOCYTES NFR BLD AUTO: 13.3 %
MAN DIFF?: NORMAL
MCHC RBC-ENTMCNC: 30.4 GM/DL
MCHC RBC-ENTMCNC: 34.3 PG
MCV RBC AUTO: 112.8 FL
MONOCYTES # BLD AUTO: 0.95 K/UL
MONOCYTES NFR BLD AUTO: 9.2 %
NEUTROPHILS # BLD AUTO: 7.26 K/UL
NEUTROPHILS NFR BLD AUTO: 70.1 %
PLATELET # BLD AUTO: 656 K/UL
RBC # BLD: 2.89 M/UL
RBC # FLD: 13.4 %
WBC # FLD AUTO: 10.36 K/UL

## 2024-08-06 ENCOUNTER — TRANSCRIPTION ENCOUNTER (OUTPATIENT)
Age: 84
End: 2024-08-06

## 2024-08-14 ENCOUNTER — LABORATORY RESULT (OUTPATIENT)
Age: 84
End: 2024-08-14

## 2024-08-24 ENCOUNTER — OUTPATIENT (OUTPATIENT)
Dept: OUTPATIENT SERVICES | Facility: HOSPITAL | Age: 84
LOS: 1 days | Discharge: HOME | End: 2024-08-24

## 2024-08-24 DIAGNOSIS — Z98.42 CATARACT EXTRACTION STATUS, LEFT EYE: Chronic | ICD-10-CM

## 2024-08-24 DIAGNOSIS — Z90.49 ACQUIRED ABSENCE OF OTHER SPECIFIED PARTS OF DIGESTIVE TRACT: Chronic | ICD-10-CM

## 2024-08-24 DIAGNOSIS — Z98.41 CATARACT EXTRACTION STATUS, RIGHT EYE: Chronic | ICD-10-CM

## 2024-08-24 DIAGNOSIS — D64.9 ANEMIA, UNSPECIFIED: ICD-10-CM

## 2024-08-24 DIAGNOSIS — Z98.890 OTHER SPECIFIED POSTPROCEDURAL STATES: Chronic | ICD-10-CM

## 2024-08-24 DIAGNOSIS — Z90.11 ACQUIRED ABSENCE OF RIGHT BREAST AND NIPPLE: Chronic | ICD-10-CM

## 2024-08-24 DIAGNOSIS — Q35.9 CLEFT PALATE, UNSPECIFIED: Chronic | ICD-10-CM

## 2024-08-24 DIAGNOSIS — Z98.82 BREAST IMPLANT STATUS: Chronic | ICD-10-CM

## 2024-08-26 NOTE — REASON FOR VISIT
[Home] : at home, [unfilled] , at the time of the visit. [Medical Office: ([facility])___] : at the medical office located in  [Verbal consent obtained from patient] : the patient, [unfilled]

## 2024-08-26 NOTE — REASON FOR VISIT
[Home] : at home, [unfilled] , at the time of the visit. [Medical Office: ([facility])___] : at the medical office located in  [Verbal consent obtained from patient] : the patient, [unfilled] 30

## 2024-08-28 ENCOUNTER — APPOINTMENT (OUTPATIENT)
Dept: HEMATOLOGY ONCOLOGY | Facility: CLINIC | Age: 84
End: 2024-08-28
Payer: MEDICARE

## 2024-08-28 ENCOUNTER — TRANSCRIPTION ENCOUNTER (OUTPATIENT)
Age: 84
End: 2024-08-28

## 2024-08-28 DIAGNOSIS — R79.89 OTHER SPECIFIED ABNORMAL FINDINGS OF BLOOD CHEMISTRY: ICD-10-CM

## 2024-08-28 DIAGNOSIS — C50.912 MALIGNANT NEOPLASM OF UNSPECIFIED SITE OF LEFT FEMALE BREAST: ICD-10-CM

## 2024-08-28 DIAGNOSIS — D64.9 ANEMIA, UNSPECIFIED: ICD-10-CM

## 2024-08-28 DIAGNOSIS — D75.839 THROMBOCYTOSIS, UNSPECIFIED: ICD-10-CM

## 2024-08-28 PROCEDURE — 99443: CPT | Mod: 93

## 2024-08-28 NOTE — HISTORY OF PRESENT ILLNESS
[Disease: _____________________] : Disease: [unfilled] [T: ___] : T[unfilled] [N: ___] : N[unfilled] [M: ___] : M[unfilled] [AJCC Stage: ____] : AJCC Stage: [unfilled] [de-identified] : History of right breast cancer 1988-status post right mastectomy with saline implant reconstruction. History of left breast cancer 7/2015-Stage IA, ER positive/[place] positive/HER-2 negative. Status post left breast lumpectomy/sentinel lymph node dissection-->RT--> Arimidex 10/2015-10/2022. Oncotype recurrence score of 11, placing patient in low risk category.  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 Bone marrow biopsy and bone marrow aspirate consistent with CALR-positive myeloproliferative neoplasm with main differential diagnosis including pre-fibrotic stage of primary MF and essential thrombocythemia.  Normal PDGFR FISH panel. Cytogenetics with normal female karyotype.  10/2022-Had TIA. 12/2022-Agreed to start Hydrea.  [de-identified] : invasive ductal carcinoma [de-identified] : Note patient is on a lung cancer screening research protocol with [facility] for which she receives regular interval CT imaging of the chest.\par  \par  Surgeon: Was Dr. Trevin Coy-#[phone] [de-identified] : 7/1/24-s/p hip surgery. No transfusions required. Having PT. Has next mammogram in November. Neurologist is Dr. Moran ([facility]). Remains on plavix and baby aspirin. No current c/o CP, SOB, fevers, cough. No new breast pain or lumps. No c/o pruritis. No abnormal bleeding reported.

## 2024-08-28 NOTE — ASSESSMENT
[FreeTextEntry1] : Lab work reviewed. 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 Though age greater than 60, BONNIE 2 negative with no history of venous thrombosis, and patient had been taking Plavix, so patient had wished for surveillance of interval follow-up lab work (and no Hydrea as had been recommended to her). 10/2022-Had TIA. 12/2022-Patient agreed to start Hydrea-adjustments continue based on interval lab work.  Have reviewed need to balance anemia versus thrombocytosis when making medication adjustments. --clinically stable at present.   #2) h/o anemia-patient will continue with p.o. folic acid supplementation for h/o elevated homocysteine level. H/O increased creatinine may contribute to anemia as well as Hydrea, and recent hip surgey. -- F/U with nephrologist for renal insufficiency. -- Should anemia prove refractory/worsen, need to consider follow-up BMB to evaluate status of her MPD/MF.  #3) History of right breast cancer 1988-status post right mastectomy with saline implant reconstruction.  #4) History of left breast cancer 7/2015-Stage IA, ER positive/[place] positive/HER-2 negative. Status post left breast lumpectomy/sentinel lymph node dissection-->RT--> Arimidex 10/2015-10/2022.  --clinically stable  11/3/2022-Left Mammogram/breast US-Benign [id number] 2. Gets breast imaging scripts from GYN [facility].  Patient's questions have been answered to her apparent satisfaction. She is agreeable to recommended f/u and concurs with plans of care.  --> Folic acid 1 mg PO daily. Aspirin 81 mg PO daily. Hydrea 1000 mg p.o. q. Monday-Wednesday and 500 mg p.o. daily on remaining days of the week; CBC q 6 weeks; RTC 3 months.

## 2024-08-28 NOTE — HISTORY OF PRESENT ILLNESS
[Disease: _____________________] : Disease: [unfilled] [T: ___] : T[unfilled] [N: ___] : N[unfilled] [M: ___] : M[unfilled] [AJCC Stage: ____] : AJCC Stage: [unfilled] [de-identified] : History of right breast cancer 1988-status post right mastectomy with saline implant reconstruction. History of left breast cancer 7/2015-Stage IA, ER positive/DE positive/HER-2 negative. Status post left breast lumpectomy/sentinel lymph node dissection-->RT--> Arimidex 10/2015-10/2022. Oncotype recurrence score of 11, placing patient in low risk category.  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 Bone marrow biopsy and bone marrow aspirate consistent with CALR-positive myeloproliferative neoplasm with main differential diagnosis including pre-fibrotic stage of primary MF and essential thrombocythemia.  Normal PDGFR FISH panel. Cytogenetics with normal female karyotype.  10/2022-Had TIA. 12/2022-Agreed to start Hydrea.  [de-identified] : invasive ductal carcinoma [de-identified] : Note patient is on a lung cancer screening research protocol with [facility] for which she receives regular interval CT imaging of the chest.\par  \par  Surgeon: Was Dr. Trevin Coy-#[phone] [de-identified] : 7/1/24-s/p hip surgery. No transfusions required. Having PT. Has next mammogram in November. Neurologist is Dr. Moran ([facility]). Remains on plavix and baby aspirin. No current c/o CP, SOB, fevers, cough. No new breast pain or lumps. No c/o pruritis. No abnormal bleeding reported.

## 2024-08-28 NOTE — ASSESSMENT
[FreeTextEntry1] : Lab work reviewed. 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 Though age greater than 60, BONNIE 2 negative with no history of venous thrombosis, and patient had been taking Plavix, so patient had wished for surveillance of interval follow-up lab work (and no Hydrea as had been recommended to her). 10/2022-Had TIA. 12/2022-Patient agreed to start Hydrea-adjustments continue based on interval lab work.  Have reviewed need to balance anemia versus thrombocytosis when making medication adjustments. --clinically stable at present.   #2) h/o anemia-patient will continue with p.o. folic acid supplementation for h/o elevated homocysteine level. H/O increased creatinine may contribute to anemia as well as Hydrea, and recent hip surgey. -- F/U with nephrologist for renal insufficiency. -- Should anemia prove refractory/worsen, need to consider follow-up BMB to evaluate status of her MPD/MF.  #3) History of right breast cancer 1988-status post right mastectomy with saline implant reconstruction.  #4) History of left breast cancer 7/2015-Stage IA, ER positive/IL positive/HER-2 negative. Status post left breast lumpectomy/sentinel lymph node dissection-->RT--> Arimidex 10/2015-10/2022.  --clinically stable  11/3/2022-Left Mammogram/breast US-Benign [id number] 2. Gets breast imaging scripts from GYN MD.  Patient's questions have been answered to her apparent satisfaction. She is agreeable to recommended f/u and concurs with plans of care.  --> Folic acid 1 mg PO daily. Aspirin 81 mg PO daily. Hydrea 1000 mg p.o. q. Monday-Wednesday and 500 mg p.o. daily on remaining days of the week; CBC q 6 weeks; RTC 3 months.

## 2024-08-28 NOTE — HISTORY OF PRESENT ILLNESS
[Disease: _____________________] : Disease: [unfilled] [T: ___] : T[unfilled] [N: ___] : N[unfilled] [M: ___] : M[unfilled] [AJCC Stage: ____] : AJCC Stage: [unfilled] [de-identified] : History of right breast cancer 1988-status post right mastectomy with saline implant reconstruction. History of left breast cancer 7/2015-Stage IA, ER positive/ME positive/HER-2 negative. Status post left breast lumpectomy/sentinel lymph node dissection-->RT--> Arimidex 10/2015-10/2022. Oncotype recurrence score of 11, placing patient in low risk category.  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 Bone marrow biopsy and bone marrow aspirate consistent with CALR-positive myeloproliferative neoplasm with main differential diagnosis including pre-fibrotic stage of primary MF and essential thrombocythemia.  Normal PDGFR FISH panel. Cytogenetics with normal female karyotype.  10/2022-Had TIA. 12/2022-Agreed to start Hydrea.  [de-identified] : invasive ductal carcinoma [de-identified] : Note patient is on a lung cancer screening research protocol with [facility] for which she receives regular interval CT imaging of the chest.\par  \par  Surgeon: Was Dr. Trevin Coy-#[phone] [de-identified] : 7/1/24-s/p hip surgery. No transfusions required. Having PT. Has next mammogram in November. Neurologist is Dr. Moran ([facility]). Remains on plavix and baby aspirin. No current c/o CP, SOB, fevers, cough. No new breast pain or lumps. No c/o pruritis. No abnormal bleeding reported.

## 2024-08-28 NOTE — ASSESSMENT
[FreeTextEntry1] : Lab work reviewed. 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 Though age greater than 58, BONNIE 2 negative with no history of venous thrombosis, and patient had been taking Plavix, so patient had wished for surveillance of interval follow-up lab work (and no Hydrea as had been recommended to her). 10/2022-Had TIA. 12/2022-Patient agreed to start Hydrea-adjustments continue based on interval lab work.  Have reviewed need to balance anemia versus thrombocytosis when making medication adjustments. --clinically stable at present.   #2) h/o anemia-patient will continue with p.o. folic acid supplementation for h/o elevated homocysteine level. H/O increased creatinine may contribute to anemia as well as Hydrea, and recent hip surgey. -- F/U with nephrologist for renal insufficiency. -- Should anemia prove refractory/worsen, need to consider follow-up BMB to evaluate status of her MPD/MF.  #3) History of right breast cancer 1988-status post right mastectomy with saline implant reconstruction.  #4) History of left breast cancer 7/2015-Stage IA, ER positive/[place] positive/HER-2 negative. Status post left breast lumpectomy/sentinel lymph node dissection-->RT--> Arimidex 10/2015-10/2022.  --clinically stable  11/3/2022-Left Mammogram/breast US-Benign [id number] 2. Gets breast imaging scripts from GYN MD.  Patient's questions have been answered to her apparent satisfaction. She is agreeable to recommended f/u and concurs with plans of care.  --> Folic acid 1 mg PO daily. Aspirin 81 mg PO daily. Hydrea 1000 mg p.o. q. Monday-Wednesday and 500 mg p.o. daily on remaining days of the week; CBC q 6 weeks; RTC 3 months.

## 2024-08-29 NOTE — END OF VISIT
[Time Spent: ___ minutes] : I have spent [unfilled] minutes of time on the encounter. moderate assist (50% patients effort)

## 2024-09-03 ENCOUNTER — APPOINTMENT (OUTPATIENT)
Dept: ORTHOPEDIC SURGERY | Facility: CLINIC | Age: 84
End: 2024-09-03
Payer: MEDICARE

## 2024-09-03 VITALS — HEIGHT: 64 IN | BODY MASS INDEX: 21.68 KG/M2 | WEIGHT: 127 LBS

## 2024-09-03 DIAGNOSIS — R26.9 UNSPECIFIED ABNORMALITIES OF GAIT AND MOBILITY: ICD-10-CM

## 2024-09-03 DIAGNOSIS — Z96.642 PRESENCE OF LEFT ARTIFICIAL HIP JOINT: ICD-10-CM

## 2024-09-03 PROCEDURE — 99024 POSTOP FOLLOW-UP VISIT: CPT

## 2024-09-03 PROCEDURE — 73502 X-RAY EXAM HIP UNI 2-3 VIEWS: CPT

## 2024-09-03 NOTE — HISTORY OF PRESENT ILLNESS
[___ Months Post Op] : [unfilled] months post op [Doing Well] : is doing well [No Sign of Infection] : is showing no signs of infection [de-identified] : S/P Left Anterior total hip replacement 7/1/2024 [de-identified] : Established patient, she presents for Post-Op OV post Left Ant. THR as above. No fall or injury. No incisional problems. Doing well. Noting progressive improvements in walking & ADLs. Waling with cane. No signif hip pain with activity, 2/10, No Rx needed. Currently in Op PT, tolerating modalities well, and noting slow improvements. Has ongoing gait disturbance and balance problems with ongoing Parkinsons Dx. [de-identified] : General/Constitutional: Well appearing, non/obese 84 year old F/in no apparent distress; height and weight as detailed below; vital signs as detailed below  Neuro: Orientation/Mental Status: Awake, alert, oriented to time, place, & person. Balance: Single leg balance fair - poor on Left / Right @ 5 sec. Sensation: intact to fine & deep touch bilat. Feet/toes;  EHL/AT(Peroneal N.): Bilat: 5/5   Vascular: DP: Bilat: 2/3  Cap refill 1-2 sec. all toes Lymph: No enlarged LN in the popliteal chain bilaterally. Skin(LE): No cellulitis, edema, and min. venous varicosities bilaterally   MS: Gait: The patient has a short stepped gait pattern [cane ]. Function: There is mod. difficulty mounting the exam table. Leg Lengths: On exam the heels reveal [alignment] in the supine position which is confirmed at the medial malleoli.   There is [ no ] rotatory positioning of the pelvis  The [ Left ] hip has no tenderness in the trochanteric bursa or the groin.  Left Hip ROM: SLR= 30 deg.; Flexion= 95 deg.; Extension= 0 deg.; Ext. Rot.= 45 deg.; Int. Rot.= 5 deg.; Abduction= 40 deg.; Adduction= 20 deg.  Strength: Hip Flexor/Extensor St.= Left: 3/5 ; Right: 3-4/5 There is no pain on ROM endpoints.  There is Min. stiffness on LEft ROM endpoints. Stability: No gross klunk/instability with ROM bilat. Hips. Incision/scar: LEft ASI incision well healed. Superior corner suture dehiscence filled in.   [de-identified] : Xrays of the Pelvis and LEft hip were performed today in the [place] office.  There is stable position of the Acetabular and femoral components with stable alignment. The lesser trochanters align on Shentons line. No Fx, FB, or heterotopic bone noted.  [de-identified] : Dr. Hunter evaluated the patient, reviewed the XRays, and is guiding her Orthopedic management. Sje was advised to continue her home exercises. and continue OP PT modalities post THR,  gait and balance improvement. He Parkinson's Dx will have limitations on the above.  The benefits of distance walking and pool walking were reviewed from an Orthopedic and general health standpoint. Continue with Abx PPX for dental procedures. COntinue with Op office F/U post THR.

## 2024-09-03 NOTE — HISTORY OF PRESENT ILLNESS
[___ Months Post Op] : [unfilled] months post op [Doing Well] : is doing well [No Sign of Infection] : is showing no signs of infection [de-identified] : S/P Left Anterior total hip replacement 7/1/2024 [de-identified] : Established patient, she presents for Post-Op OV post Left Ant. THR as above. No fall or injury. No incisional problems. Doing well. Noting progressive improvements in walking & ADLs. Waling with cane. No signif hip pain with activity, 2/10, No Rx needed. Currently in Op PT, tolerating modalities well, and noting slow improvements. Has ongoing gait disturbance and balance problems with ongoing Parkinsons Dx. [de-identified] : General/Constitutional: Well appearing, non/obese 84 year old F/in no apparent distress; height and weight as detailed below; vital signs as detailed below  Neuro: Orientation/Mental Status: Awake, alert, oriented to time, place, & person. Balance: Single leg balance fair - poor on Left / Right @ 5 sec. Sensation: intact to fine & deep touch bilat. Feet/toes;  EHL/AT(Peroneal N.): Bilat: 5/5   Vascular: DP: Bilat: 2/3  Cap refill 1-2 sec. all toes Lymph: No enlarged LN in the popliteal chain bilaterally. Skin(LE): No cellulitis, edema, and min. venous varicosities bilaterally   MS: Gait: The patient has a short stepped gait pattern [cane ]. Function: There is mod. difficulty mounting the exam table. Leg Lengths: On exam the heels reveal [alignment] in the supine position which is confirmed at the medial malleoli.   There is [ no ] rotatory positioning of the pelvis  The [ Left ] hip has no tenderness in the trochanteric bursa or the groin.  Left Hip ROM: SLR= 30 deg.; Flexion= 95 deg.; Extension= 0 deg.; Ext. Rot.= 45 deg.; Int. Rot.= 5 deg.; Abduction= 40 deg.; Adduction= 20 deg.  Strength: Hip Flexor/Extensor St.= Left: 3/5 ; Right: 3-4/5 There is no pain on ROM endpoints.  There is Min. stiffness on LEft ROM endpoints. Stability: No gross klunk/instability with ROM bilat. Hips. Incision/scar: LEft ASI incision well healed. Superior corner suture dehiscence filled in.   [de-identified] : Xrays of the Pelvis and LEft hip were performed today in the [place] office.  There is stable position of the Acetabular and femoral components with stable alignment. The lesser trochanters align on Shentons line. No Fx, FB, or heterotopic bone noted.  [de-identified] : Dr. Hunter evaluated the patient, reviewed the XRays, and is guiding her Orthopedic management. Sje was advised to continue her home exercises. and continue OP PT modalities post THR,  gait and balance improvement. He Parkinson's Dx will have limitations on the above.  The benefits of distance walking and pool walking were reviewed from an Orthopedic and general health standpoint. Continue with Abx PPX for dental procedures. COntinue with Op office F/U post THR.

## 2024-09-03 NOTE — HISTORY OF PRESENT ILLNESS
[___ Months Post Op] : [unfilled] months post op [Doing Well] : is doing well [No Sign of Infection] : is showing no signs of infection [de-identified] : S/P Left Anterior total hip replacement 7/1/2024 [de-identified] : Established patient, she presents for Post-Op OV post Left Ant. THR as above. No fall or injury. No incisional problems. Doing well. Noting progressive improvements in walking & ADLs. Waling with cane. No signif hip pain with activity, 2/10, No Rx needed. Currently in Op PT, tolerating modalities well, and noting slow improvements. Has ongoing gait disturbance and balance problems with ongoing Parkinsons Dx. [de-identified] : General/Constitutional: Well appearing, non/obese 84 year old F/in no apparent distress; height and weight as detailed below; vital signs as detailed below  Neuro: Orientation/Mental Status: Awake, alert, oriented to time, place, & person. Balance: Single leg balance fair - poor on Left / Right @ 5 sec. Sensation: intact to fine & deep touch bilat. Feet/toes;  EHL/AT(Peroneal N.): Bilat: 5/5   Vascular: DP: Bilat: 2/3  Cap refill 1-2 sec. all toes Lymph: No enlarged LN in the popliteal chain bilaterally. Skin(LE): No cellulitis, edema, and min. venous varicosities bilaterally   MS: Gait: The patient has a short stepped gait pattern [cane ]. Function: There is mod. difficulty mounting the exam table. Leg Lengths: On exam the heels reveal [alignment] in the supine position which is confirmed at the medial malleoli.   There is [ no ] rotatory positioning of the pelvis  The [ Left ] hip has no tenderness in the trochanteric bursa or the groin.  Left Hip ROM: SLR= 30 deg.; Flexion= 95 deg.; Extension= 0 deg.; Ext. Rot.= 45 deg.; Int. Rot.= 5 deg.; Abduction= 40 deg.; Adduction= 20 deg.  Strength: Hip Flexor/Extensor St.= Left: 3/5 ; Right: 3-4/5 There is no pain on ROM endpoints.  There is Min. stiffness on LEft ROM endpoints. Stability: No gross klunk/instability with ROM bilat. Hips. Incision/scar: LEft ASI incision well healed. Superior corner suture dehiscence filled in.   [de-identified] : Xrays of the Pelvis and LEft hip were performed today in the [place] office.  There is stable position of the Acetabular and femoral components with stable alignment. The lesser trochanters align on Shentons line. No Fx, FB, or heterotopic bone noted.  [de-identified] : Dr. Hunter evaluated the patient, reviewed the XRays, and is guiding her Orthopedic management. Sje was advised to continue her home exercises. and continue OP PT modalities post THR,  gait and balance improvement. He Parkinson's Dx will have limitations on the above.  The benefits of distance walking and pool walking were reviewed from an Orthopedic and general health standpoint. Continue with Abx PPX for dental procedures. COntinue with Op office F/U post THR.

## 2024-09-10 ENCOUNTER — NON-APPOINTMENT (OUTPATIENT)
Age: 84
End: 2024-09-10

## 2024-09-24 NOTE — REASON FOR VISIT
Detail Level: Simple [Home] : at home, [unfilled] , at the time of the visit. [Medical Office: ([facility])___] : at the medical office located in  [Verbal consent obtained from patient] : the patient, [unfilled]

## 2024-10-07 ENCOUNTER — LABORATORY RESULT (OUTPATIENT)
Age: 84
End: 2024-10-07

## 2024-10-24 ENCOUNTER — RX RENEWAL (OUTPATIENT)
Age: 84
End: 2024-10-24

## 2024-11-18 ENCOUNTER — LABORATORY RESULT (OUTPATIENT)
Age: 84
End: 2024-11-18

## 2024-11-26 ENCOUNTER — OUTPATIENT (OUTPATIENT)
Dept: OUTPATIENT SERVICES | Facility: HOSPITAL | Age: 84
LOS: 1 days | Discharge: HOME | End: 2024-11-26

## 2024-11-26 DIAGNOSIS — Z98.82 BREAST IMPLANT STATUS: Chronic | ICD-10-CM

## 2024-11-26 DIAGNOSIS — Z98.890 OTHER SPECIFIED POSTPROCEDURAL STATES: Chronic | ICD-10-CM

## 2024-11-26 DIAGNOSIS — Q35.9 CLEFT PALATE, UNSPECIFIED: Chronic | ICD-10-CM

## 2024-11-26 DIAGNOSIS — Z90.49 ACQUIRED ABSENCE OF OTHER SPECIFIED PARTS OF DIGESTIVE TRACT: Chronic | ICD-10-CM

## 2024-11-26 DIAGNOSIS — Z90.11 ACQUIRED ABSENCE OF RIGHT BREAST AND NIPPLE: Chronic | ICD-10-CM

## 2024-11-26 DIAGNOSIS — Z98.42 CATARACT EXTRACTION STATUS, LEFT EYE: Chronic | ICD-10-CM

## 2024-11-26 DIAGNOSIS — Z98.41 CATARACT EXTRACTION STATUS, RIGHT EYE: Chronic | ICD-10-CM

## 2024-11-26 DIAGNOSIS — D64.9 ANEMIA, UNSPECIFIED: ICD-10-CM

## 2024-12-02 ENCOUNTER — APPOINTMENT (OUTPATIENT)
Dept: HEMATOLOGY ONCOLOGY | Facility: CLINIC | Age: 84
End: 2024-12-02
Payer: MEDICARE

## 2024-12-02 VITALS
OXYGEN SATURATION: 99 % | TEMPERATURE: 97.2 F | RESPIRATION RATE: 17 BRPM | WEIGHT: 126 LBS | SYSTOLIC BLOOD PRESSURE: 121 MMHG | HEIGHT: 64 IN | HEART RATE: 80 BPM | DIASTOLIC BLOOD PRESSURE: 64 MMHG | BODY MASS INDEX: 21.51 KG/M2

## 2024-12-02 DIAGNOSIS — R79.89 OTHER SPECIFIED ABNORMAL FINDINGS OF BLOOD CHEMISTRY: ICD-10-CM

## 2024-12-02 DIAGNOSIS — C50.912 MALIGNANT NEOPLASM OF UNSPECIFIED SITE OF LEFT FEMALE BREAST: ICD-10-CM

## 2024-12-02 DIAGNOSIS — D75.839 THROMBOCYTOSIS, UNSPECIFIED: ICD-10-CM

## 2024-12-02 DIAGNOSIS — D64.9 ANEMIA, UNSPECIFIED: ICD-10-CM

## 2024-12-02 PROCEDURE — 99214 OFFICE O/P EST MOD 30 MIN: CPT

## 2024-12-02 PROCEDURE — G2211 COMPLEX E/M VISIT ADD ON: CPT

## 2025-01-14 ENCOUNTER — APPOINTMENT (OUTPATIENT)
Dept: RADIOLOGY | Facility: HOSPITAL | Age: 85
End: 2025-01-14
Payer: MEDICARE

## 2025-01-14 ENCOUNTER — APPOINTMENT (OUTPATIENT)
Dept: ULTRASOUND IMAGING | Facility: HOSPITAL | Age: 85
End: 2025-01-14
Payer: MEDICARE

## 2025-01-14 ENCOUNTER — APPOINTMENT (OUTPATIENT)
Age: 85
End: 2025-01-14
Payer: MEDICARE

## 2025-01-14 ENCOUNTER — OUTPATIENT (OUTPATIENT)
Dept: OUTPATIENT SERVICES | Facility: HOSPITAL | Age: 85
LOS: 1 days | End: 2025-01-14
Payer: MEDICARE

## 2025-01-14 VITALS
SYSTOLIC BLOOD PRESSURE: 133 MMHG | HEIGHT: 64 IN | TEMPERATURE: 98 F | RESPIRATION RATE: 16 BRPM | WEIGHT: 126 LBS | OXYGEN SATURATION: 98 % | BODY MASS INDEX: 21.51 KG/M2 | DIASTOLIC BLOOD PRESSURE: 66 MMHG | HEART RATE: 85 BPM

## 2025-01-14 DIAGNOSIS — D75.839 THROMBOCYTOSIS, UNSPECIFIED: ICD-10-CM

## 2025-01-14 DIAGNOSIS — Z98.41 CATARACT EXTRACTION STATUS, RIGHT EYE: Chronic | ICD-10-CM

## 2025-01-14 DIAGNOSIS — M25.471 EFFUSION, RIGHT ANKLE: ICD-10-CM

## 2025-01-14 DIAGNOSIS — Z98.890 OTHER SPECIFIED POSTPROCEDURAL STATES: Chronic | ICD-10-CM
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
## 2025-01-14 DIAGNOSIS — Z90.49 ACQUIRED ABSENCE OF OTHER SPECIFIED PARTS OF DIGESTIVE TRACT: Chronic | ICD-10-CM

## 2025-01-14 DIAGNOSIS — Q35.9 CLEFT PALATE, UNSPECIFIED: Chronic | ICD-10-CM

## 2025-01-14 PROCEDURE — 93971 EXTREMITY STUDY: CPT | Mod: 26,RT

## 2025-01-14 PROCEDURE — 99204 OFFICE O/P NEW MOD 45 MIN: CPT

## 2025-01-14 PROCEDURE — 73610 X-RAY EXAM OF ANKLE: CPT | Mod: 26,RT

## 2025-01-14 PROCEDURE — 73610 X-RAY EXAM OF ANKLE: CPT

## 2025-01-14 PROCEDURE — 93971 EXTREMITY STUDY: CPT

## 2025-01-15 ENCOUNTER — APPOINTMENT (OUTPATIENT)
Dept: ORTHOPEDIC SURGERY | Facility: CLINIC | Age: 85
End: 2025-01-15
Payer: MEDICARE

## 2025-01-15 DIAGNOSIS — S92.031A DISPLACED AVULSION FRACTURE OF TUBEROSITY OF RIGHT CALCANEUS, INITIAL ENCOUNTER FOR CLOSED FRACTURE: ICD-10-CM

## 2025-01-15 PROCEDURE — 99214 OFFICE O/P EST MOD 30 MIN: CPT

## 2025-01-15 PROCEDURE — 73610 X-RAY EXAM OF ANKLE: CPT | Mod: RT

## 2025-01-21 ENCOUNTER — APPOINTMENT (OUTPATIENT)
Dept: ORTHOPEDIC SURGERY | Facility: CLINIC | Age: 85
End: 2025-01-21
Payer: MEDICARE

## 2025-01-21 DIAGNOSIS — D75.839 THROMBOCYTOSIS, UNSPECIFIED: ICD-10-CM

## 2025-01-21 DIAGNOSIS — M21.41 FLAT FOOT [PES PLANUS] (ACQUIRED), RIGHT FOOT: ICD-10-CM

## 2025-01-21 PROCEDURE — 73610 X-RAY EXAM OF ANKLE: CPT | Mod: RT

## 2025-01-21 PROCEDURE — 99215 OFFICE O/P EST HI 40 MIN: CPT

## 2025-01-22 ENCOUNTER — OUTPATIENT (OUTPATIENT)
Dept: OUTPATIENT SERVICES | Facility: HOSPITAL | Age: 85
LOS: 1 days | End: 2025-01-22
Payer: MEDICARE

## 2025-01-22 ENCOUNTER — APPOINTMENT (OUTPATIENT)
Dept: MRI IMAGING | Facility: HOSPITAL | Age: 85
End: 2025-01-22

## 2025-01-22 DIAGNOSIS — Z98.42 CATARACT EXTRACTION STATUS, LEFT EYE: Chronic | ICD-10-CM

## 2025-01-22 DIAGNOSIS — S92.031A DISPLACED AVULSION FRACTURE OF TUBEROSITY OF RIGHT CALCANEUS, INITIAL ENCOUNTER FOR CLOSED FRACTURE: ICD-10-CM

## 2025-01-22 DIAGNOSIS — Z98.41 CATARACT EXTRACTION STATUS, RIGHT EYE: Chronic | ICD-10-CM

## 2025-01-22 DIAGNOSIS — Z90.49 ACQUIRED ABSENCE OF OTHER SPECIFIED PARTS OF DIGESTIVE TRACT: Chronic | ICD-10-CM

## 2025-01-22 DIAGNOSIS — Q35.9 CLEFT PALATE, UNSPECIFIED: Chronic | ICD-10-CM

## 2025-01-22 DIAGNOSIS — Z90.11 ACQUIRED ABSENCE OF RIGHT BREAST AND NIPPLE: Chronic | ICD-10-CM

## 2025-01-22 DIAGNOSIS — Z98.890 OTHER SPECIFIED POSTPROCEDURAL STATES: Chronic | ICD-10-CM

## 2025-01-22 PROBLEM — Z92.29 HISTORY OF ANTICOAGULANT THERAPY: Status: ACTIVE | Noted: 2025-01-22

## 2025-01-22 PROBLEM — S92.041G: Status: ACTIVE | Noted: 2025-01-22

## 2025-01-22 PROBLEM — M21.41 ACQUIRED PES PLANUS OF RIGHT FOOT: Status: ACTIVE | Noted: 2025-01-22

## 2025-01-22 PROBLEM — I99.8 VASCULAR CALCIFICATION: Status: ACTIVE | Noted: 2025-01-22

## 2025-01-22 PROCEDURE — 73721 MRI JNT OF LWR EXTRE W/O DYE: CPT

## 2025-01-22 PROCEDURE — 73721 MRI JNT OF LWR EXTRE W/O DYE: CPT | Mod: 26,RT

## 2025-01-25 ENCOUNTER — NON-APPOINTMENT (OUTPATIENT)
Age: 85
End: 2025-01-25

## 2025-01-27 ENCOUNTER — NON-APPOINTMENT (OUTPATIENT)
Age: 85
End: 2025-01-27

## 2025-01-28 ENCOUNTER — APPOINTMENT (OUTPATIENT)
Dept: ORTHOPEDIC SURGERY | Facility: CLINIC | Age: 85
End: 2025-01-28
Payer: MEDICARE

## 2025-01-28 ENCOUNTER — TRANSCRIPTION ENCOUNTER (OUTPATIENT)
Age: 85
End: 2025-01-28

## 2025-01-28 DIAGNOSIS — S92.041G: ICD-10-CM

## 2025-01-28 DIAGNOSIS — M81.0 AGE-RELATED OSTEOPOROSIS W/OUT CURRENT PATHOLOGICAL FRACTURE: ICD-10-CM

## 2025-01-28 DIAGNOSIS — Z92.29 PERSONAL HISTORY OF OTHER DRUG THERAPY: ICD-10-CM

## 2025-01-28 DIAGNOSIS — I99.8 OTHER DISORDER OF CIRCULATORY SYSTEM: ICD-10-CM

## 2025-01-28 PROCEDURE — 99215 OFFICE O/P EST HI 40 MIN: CPT

## 2025-01-31 ENCOUNTER — OUTPATIENT (OUTPATIENT)
Dept: OUTPATIENT SERVICES | Facility: HOSPITAL | Age: 85
LOS: 1 days | End: 2025-01-31
Payer: MEDICARE

## 2025-01-31 VITALS
HEIGHT: 64 IN | DIASTOLIC BLOOD PRESSURE: 73 MMHG | HEART RATE: 89 BPM | TEMPERATURE: 98 F | RESPIRATION RATE: 18 BRPM | WEIGHT: 121.92 LBS | SYSTOLIC BLOOD PRESSURE: 132 MMHG | OXYGEN SATURATION: 97 %

## 2025-01-31 DIAGNOSIS — Z96.642 PRESENCE OF LEFT ARTIFICIAL HIP JOINT: Chronic | ICD-10-CM

## 2025-01-31 DIAGNOSIS — Z90.49 ACQUIRED ABSENCE OF OTHER SPECIFIED PARTS OF DIGESTIVE TRACT: Chronic | ICD-10-CM

## 2025-01-31 DIAGNOSIS — Z98.890 OTHER SPECIFIED POSTPROCEDURAL STATES: Chronic | ICD-10-CM

## 2025-01-31 DIAGNOSIS — Z90.11 ACQUIRED ABSENCE OF RIGHT BREAST AND NIPPLE: Chronic | ICD-10-CM

## 2025-01-31 DIAGNOSIS — Z98.42 CATARACT EXTRACTION STATUS, LEFT EYE: Chronic | ICD-10-CM

## 2025-01-31 DIAGNOSIS — S92.041G: ICD-10-CM

## 2025-01-31 DIAGNOSIS — Z98.41 CATARACT EXTRACTION STATUS, RIGHT EYE: Chronic | ICD-10-CM

## 2025-01-31 DIAGNOSIS — S92.041B: ICD-10-CM

## 2025-01-31 DIAGNOSIS — Q35.9 CLEFT PALATE, UNSPECIFIED: Chronic | ICD-10-CM

## 2025-01-31 DIAGNOSIS — Z98.82 BREAST IMPLANT STATUS: Chronic | ICD-10-CM

## 2025-01-31 DIAGNOSIS — I99.8 OTHER DISORDER OF CIRCULATORY SYSTEM: ICD-10-CM

## 2025-01-31 LAB
ANION GAP SERPL CALC-SCNC: 13 MMOL/L — SIGNIFICANT CHANGE UP (ref 5–17)
BUN SERPL-MCNC: 33 MG/DL — HIGH (ref 7–23)
CALCIUM SERPL-MCNC: 10.7 MG/DL — HIGH (ref 8.4–10.5)
CHLORIDE SERPL-SCNC: 109 MMOL/L — HIGH (ref 96–108)
CO2 SERPL-SCNC: 23 MMOL/L — SIGNIFICANT CHANGE UP (ref 22–31)
CREAT SERPL-MCNC: 1.29 MG/DL — SIGNIFICANT CHANGE UP (ref 0.5–1.3)
EGFR: 40 ML/MIN/1.73M2 — LOW
GLUCOSE SERPL-MCNC: 145 MG/DL — HIGH (ref 70–99)
HCT VFR BLD CALC: 31.7 % — LOW (ref 34.5–45)
HGB BLD-MCNC: 10.1 G/DL — LOW (ref 11.5–15.5)
MCHC RBC-ENTMCNC: 31.9 G/DL — LOW (ref 32–36)
MCHC RBC-ENTMCNC: 35.1 PG — HIGH (ref 27–34)
MCV RBC AUTO: 110.1 FL — HIGH (ref 80–100)
NRBC # BLD: 0 /100 WBCS — SIGNIFICANT CHANGE UP (ref 0–0)
NRBC BLD-RTO: 0 /100 WBCS — SIGNIFICANT CHANGE UP (ref 0–0)
PLATELET # BLD AUTO: 674 K/UL — HIGH (ref 150–400)
POTASSIUM SERPL-MCNC: 4.2 MMOL/L — SIGNIFICANT CHANGE UP (ref 3.5–5.3)
POTASSIUM SERPL-SCNC: 4.2 MMOL/L — SIGNIFICANT CHANGE UP (ref 3.5–5.3)
RBC # BLD: 2.88 M/UL — LOW (ref 3.8–5.2)
RBC # FLD: 13.6 % — SIGNIFICANT CHANGE UP (ref 10.3–14.5)
SODIUM SERPL-SCNC: 145 MMOL/L — SIGNIFICANT CHANGE UP (ref 135–145)
WBC # BLD: 7.21 K/UL — SIGNIFICANT CHANGE UP (ref 3.8–10.5)
WBC # FLD AUTO: 7.21 K/UL — SIGNIFICANT CHANGE UP (ref 3.8–10.5)

## 2025-01-31 NOTE — H&P PST ADULT - TOBACCO, LAST USE DATE, PROFILE
31-Jan-1982 Bactrim Counseling:  I discussed with the patient the risks of sulfa antibiotics including but not limited to GI upset, allergic reaction, drug rash, diarrhea, dizziness, photosensitivity, and yeast infections.  Rarely, more serious reactions can occur including but not limited to aplastic anemia, agranulocytosis, methemoglobinemia, blood dyscrasias, liver or kidney failure, lung infiltrates or desquamative/blistering drug rashes.

## 2025-01-31 NOTE — H&P PST ADULT - NSICDXPASTMEDICALHX_GEN_ALL_CORE_FT
PAST MEDICAL HISTORY:  Bilateral hearing loss     COVID-19 vaccine series completed     DDD (degenerative disc disease), lumbar     History of cancer of right breast     History of constipation     History of left breast cancer     History of sinusitis     History of thrombocytosis     HLD (hyperlipidemia)     Hypertension     Lung nodules     Osteoarthritis     Osteopenia     Primary osteoarthritis of left hip     Seasonal allergies     TIA (transient ischemic attack)

## 2025-01-31 NOTE — H&P PST ADULT - NSICDXFAMILYHX_GEN_ALL_CORE_FT
FAMILY HISTORY:  Father  Still living? No  FHx: coronary artery disease, Age at diagnosis: Age Unknown    Mother  Still living? No  Family history of stroke, Age at diagnosis: Age Unknown    Sibling  Still living? Yes, Estimated age: 81-90  Family history of osteoarthritis, Age at diagnosis: Age Unknown  Family history of type 2 diabetes mellitus, Age at diagnosis: Age Unknown  FHx: coronary artery disease, Age at diagnosis: Age Unknown  Family history of type 2 diabetes mellitus, Age at diagnosis: Age Unknown  FHx: coronary artery disease, Age at diagnosis: Age Unknown  Family history of heart attack, Age at diagnosis: Age Unknown

## 2025-01-31 NOTE — H&P PST ADULT - HISTORY OF PRESENT ILLNESS
84 year old female presents to PST prior to scheduled ORIF of her right calcaneous fracture on 2/7/25 with Dr Short. Patient was at home, her dog ran into her and she fell causing pain to her right foot. She was seen at urgent care, then the ER and was told she had a calcaneous fracture. PMHx includes TIA x 2 (January 2023 - takes Plavix A/C medication). Thrombocytosis (followed by hematology), HLD, HTN, OA, b/l breast CA (History of right breast cancer 1988-status post right mastectomy with saline implant reconstruction. History of left breast cancer 7/2015-Stage IA, ER positive/[place] positive/HER-2 negative. Status post left breast lumpectomy/sentinel lymph node), Lung nodules (currently in lung cancer screening research at [facility] where she receives regular CT scans of her chest) and degenerative disc disease. Recenlty (July 2024) patient underwent left total hip replacement at [facility] without any complications per patient. Overall patient is feeling "fine" denies chest pain / sob / fevers / chills.   84 year old female presents to PST prior to scheduled ORIF of her right calcaneous fracture on 2/7/25 with Dr Short. Patient was at home, her dog ran into her and she fell causing pain to her right foot. She was seen at urgent care, then the ER and was told she had a calcaneous fracture. Patient endorses that her foot does not hurt her but every so often she will feel a twinge. PMHx includes TIA x 2 (January 2023 episode of feeling "weird" saw neurologist - takes Plavix A/C medication). Thrombocytosis (followed by hematology), HLD, HTN, OA, b/l breast CA (History of right breast cancer 1988-status post right mastectomy with saline implant reconstruction. History of left breast cancer 7/2015-Stage IA, ER positive/[place] positive/HER-2 negative. Status post left breast lumpectomy/sentinel lymph node), former smoker, Lung nodules (currently in lung cancer screening research at [facility] where she receives regular CT scans of her chest, which she states are "fine") and degenerative disc disease. Recently (July 2024) patient underwent left total hip replacement at [facility] without any complications per patient. Overall patient is feeling "fine" denies chest pain / sob / fevers / chills.   84 year old female presents to PST prior to scheduled ORIF of her right calcaneous fracture, Achilles repair / reconstruction on 2/7/25 with Dr Short. Patient was at home, her dog ran into her and she fell causing pain to her right foot. She was seen at urgent care, then the ER and was told she had a calcaneous fracture. Patient endorses that her foot does not hurt her but every so often she will feel a twinge. PMHx includes TIA x 2 (January 2023 episode of feeling "weird" saw neurologist - takes Plavix A/C medication). Thrombocytosis (followed by hematology), HLD, HTN, OA, b/l breast CA (History of right breast cancer 1988-status post right mastectomy with saline implant reconstruction. History of left breast cancer 7/2015-Stage IA, ER positive/[place] positive/HER-2 negative. Status post left breast lumpectomy/sentinel lymph node), former smoker, Lung nodules (currently in lung cancer screening research at [facility] where she receives regular CT scans of her chest, which she states are "fine") and degenerative disc disease. Recently (July 2024) patient underwent left total hip replacement at [facility] without any complications per patient. Overall patient is feeling "fine" denies chest pain / sob / fevers / chills.

## 2025-01-31 NOTE — H&P PST ADULT - MUSCULOSKELETAL COMMENTS
Extremity: posterior prominence R distal leg with skin at risk, Strickland testing abnormal R LE Extremity: residual prominence posterior aspect R distal leg with skin at risk, Strickland testing abnormal R LE

## 2025-01-31 NOTE — H&P PST ADULT - PRIMARY CARE PROVIDER
Everton Rod [id number] Everton Rod [id number] (last seen 5 months ago, goes every 6 months - eval performed )

## 2025-01-31 NOTE — H&P PST ADULT - NSICDXPASTSURGICALHX_GEN_ALL_CORE_FT
PAST SURGICAL HISTORY:  Cleft palate     History of appendectomy     History of left cataract extraction     History of lumpectomy of left breast     History of right breast implant     History of right cataract extraction     History of total mastectomy of right breast     S/P hip replacement, left     Status post functional endoscopic sinus surgery (FESS)

## 2025-01-31 NOTE — H&P PST ADULT - REASON FOR ADMISSION
Open reduction internal fixation right calcaneous fracture  GOAL: no more pain in foot Open reduction internal fixation right calcaneous fracture  GOAL: to fix my fractured foot

## 2025-01-31 NOTE — H&P PST ADULT - PROBLEM SELECTOR PLAN 1
CBC BMP in PST  instructions provided, chlorhexidine soap provided and diet reviewed  all questions answered during exam  Plavix last dose today 1/31/25 CBC BMP in PST  instructions provided, chlorhexidine soap provided and diet reviewed  all questions answered during exam  Plavix last dose today 1/31/25 - patient spoke with neurologist who advised her to stop today   folic acid last dose 1/31/25   patient will continue with 81mg aspirin daily throughout the ash op period

## 2025-01-31 NOTE — H&P PST ADULT - ASSESSMENT
Airway:  normal    Mallampati-     2  Dental: Patient denies loose teeth    Activity :  dasi mets :   CAPRINI SCORE    AGE RELATED RISK FACTORS                                                             [ ] Age 41-60 years                                            (1 Point)  [ ] Age: 61-74 years                                           (2 Points)                 [x ] Age= 75 years                                                (3 Points)             DISEASE RELATED RISK FACTORS                                                       [ ] Edema in the lower extremities                 (1 Point)                     [ ] Varicose veins                                               (1 Point)                                 [ ] BMI > 25 Kg/m2                                            (1 Point)                                  [ ] Serious infection (ie PNA)                            (1 Point)                     [ ] Lung disease ( COPD, Emphysema)            (1 Point)                                                                          [ ] Acute myocardial infarction                         (1 Point)                  [ ] Congestive heart failure (in the previous month)  (1 Point)         [ ] Inflammatory bowel disease                            (1 Point)                  [ ] Central venous access, PICC or Port               (2 points)       (within the last month)                                                                [ ] Stroke (in the previous month)                        (5 Points)    [x ] Previous or present malignancy                       (2 points)                                                                                                                                                         HEMATOLOGY RELATED FACTORS                                                         [ ] Prior episodes of VTE                                     (3 Points)                     [ ] Positive family history for VTE                      (3 Points)                  [ ] Prothrombin 50517 A                                     (3 Points)                     [ ] Factor V Leiden                                                (3 Points)                        [ ] Lupus anticoagulants                                      (3 Points)                                                           [ ] Anticardiolipin antibodies                              (3 Points)                                                       [ ] High homocysteine in the blood                   (3 Points)                                             [ ] Other congenital or acquired thrombophilia      (3 Points)                                                [ ] Heparin induced thrombocytopenia                  (3 Points)                                        MOBILITY RELATED FACTORS  [ ] Bed rest                                                         (1 Point)  [ ] Plaster cast                                                    (2 points)  [ ] Bed bound for more than 72 hours           (2 Points)    GENDER SPECIFIC FACTORS  [ ] Pregnancy or had a baby within the last month   (1 Point)  [ ] Post-partum < 6 weeks                                   (1 Point)  [ ] Hormonal therapy  or oral contraception   (1 Point)  [ ] History of pregnancy complications              (1 point)  [ ] Unexplained or recurrent              (1 Point)    OTHER RISK FACTORS                                           (1 Point)  [ ] BMI >40, smoking, diabetes requiring insulin, chemotherapy  blood transfusions and length of surgery over 2 hours    SURGERY RELATED RISK FACTORS  [ ]  Section within the last month     (1 Point)  [ ] Minor surgery                                                  (1 Point)  [ ] Arthroscopic surgery                                       (2 Points)  [ ] Planned major surgery lasting more            (2 Points)      than 45 minutes     [x ] Elective hip or knee joint replacement       (5 points)       surgery                                                TRAUMA RELATED RISK FACTORS  [ ] Fracture of the hip, pelvis, or leg                       (5 Points)  [ ] Spinal cord injury resulting in paralysis             (5 points)       (in the previous month)    [ ] Paralysis  (less than 1 month)                             (5 Points)  [ ] Multiple Trauma within 1 month                        (5 Points)    Total Score [  10      ]    Caprini Score 0-2: Low Risk, NO VTE prophylaxis required for most patients, encourage ambulation  Caprini Score 3-6: Moderate Risk , pharmacologic VTE prophylaxis is indicated for most patients (in the absence of contraindications)  Caprini Score Greater than or =7: High risk, pharmocologic VTE prophylaxis indicated for most patients (in the absence of contraindications)                                 Airway:  normal    Mallampati-     2  Dental: Patient denies loose teeth      CAPRINI SCORE    AGE RELATED RISK FACTORS                                                             [ ] Age 41-60 years                                            (1 Point)  [ ] Age: 61-74 years                                           (2 Points)                 [x ] Age= 75 years                                                (3 Points)             DISEASE RELATED RISK FACTORS                                                       [ ] Edema in the lower extremities                 (1 Point)                     [ ] Varicose veins                                               (1 Point)                                 [ ] BMI > 25 Kg/m2                                            (1 Point)                                  [ ] Serious infection (ie PNA)                            (1 Point)                     [ ] Lung disease ( COPD, Emphysema)            (1 Point)                                                                          [ ] Acute myocardial infarction                         (1 Point)                  [ ] Congestive heart failure (in the previous month)  (1 Point)         [ ] Inflammatory bowel disease                            (1 Point)                  [ ] Central venous access, PICC or Port               (2 points)       (within the last month)                                                                [ ] Stroke (in the previous month)                        (5 Points)    [x ] Previous or present malignancy                       (2 points)                                                                                                                                                         HEMATOLOGY RELATED FACTORS                                                         [ ] Prior episodes of VTE                                     (3 Points)                     [ ] Positive family history for VTE                      (3 Points)                  [ ] Prothrombin 41527 A                                     (3 Points)                     [ ] Factor V Leiden                                                (3 Points)                        [ ] Lupus anticoagulants                                      (3 Points)                                                           [ ] Anticardiolipin antibodies                              (3 Points)                                                       [ ] High homocysteine in the blood                   (3 Points)                                             [ ] Other congenital or acquired thrombophilia      (3 Points)                                                [ ] Heparin induced thrombocytopenia                  (3 Points)                                        MOBILITY RELATED FACTORS  [ ] Bed rest                                                         (1 Point)  [ ] Plaster cast                                                    (2 points)  [ ] Bed bound for more than 72 hours           (2 Points)    GENDER SPECIFIC FACTORS  [ ] Pregnancy or had a baby within the last month   (1 Point)  [ ] Post-partum < 6 weeks                                   (1 Point)  [ ] Hormonal therapy  or oral contraception   (1 Point)  [ ] History of pregnancy complications              (1 point)  [ ] Unexplained or recurrent              (1 Point)    OTHER RISK FACTORS                                           (1 Point)  [ ] BMI >40, smoking, diabetes requiring insulin, chemotherapy  blood transfusions and length of surgery over 2 hours    SURGERY RELATED RISK FACTORS  [ ]  Section within the last month     (1 Point)  [ ] Minor surgery                                                  (1 Point)  [ ] Arthroscopic surgery                                       (2 Points)  [ ] Planned major surgery lasting more            (2 Points)      than 45 minutes     [x ] Elective hip or knee joint replacement       (5 points)       surgery                                                TRAUMA RELATED RISK FACTORS  [ ] Fracture of the hip, pelvis, or leg                       (5 Points)  [ ] Spinal cord injury resulting in paralysis             (5 points)       (in the previous month)    [ ] Paralysis  (less than 1 month)                             (5 Points)  [ ] Multiple Trauma within 1 month                        (5 Points)    Total Score [  10      ]    Caprini Score 0-2: Low Risk, NO VTE prophylaxis required for most patients, encourage ambulation  Caprini Score 3-6: Moderate Risk , pharmacologic VTE prophylaxis is indicated for most patients (in the absence of contraindications)  Caprini Score Greater than or =7: High risk, pharmocologic VTE prophylaxis indicated for most patients (in the absence of contraindications)

## 2025-01-31 NOTE — H&P PST ADULT - RESPIRATORY
clear to auscultation bilaterally/no wheezes/no rales/no respiratory distress/airway patent/breath sounds equal/good air movement

## 2025-01-31 NOTE — H&P PST ADULT - MUSCULOSKELETAL
no calf tenderness/strength 5/5 bilateral upper extremities/no chest wall tenderness/abnormal gait details…

## 2025-02-07 ENCOUNTER — APPOINTMENT (OUTPATIENT)
Dept: ORTHOPEDIC SURGERY | Facility: HOSPITAL | Age: 85
End: 2025-02-07

## 2025-02-07 ENCOUNTER — INPATIENT (INPATIENT)
Facility: HOSPITAL | Age: 85
LOS: 2 days | Discharge: NURSING, REHAB OR LONG-TERM CARE | DRG: 303 | End: 2025-02-10
Attending: ORTHOPAEDIC SURGERY | Admitting: ORTHOPAEDIC SURGERY
Payer: MEDICARE

## 2025-02-07 VITALS
RESPIRATION RATE: 18 BRPM | SYSTOLIC BLOOD PRESSURE: 151 MMHG | OXYGEN SATURATION: 98 % | WEIGHT: 121.92 LBS | HEART RATE: 91 BPM | DIASTOLIC BLOOD PRESSURE: 63 MMHG | HEIGHT: 64 IN | TEMPERATURE: 98 F

## 2025-02-07 DIAGNOSIS — Z90.49 ACQUIRED ABSENCE OF OTHER SPECIFIED PARTS OF DIGESTIVE TRACT: Chronic | ICD-10-CM

## 2025-02-07 DIAGNOSIS — I99.8 OTHER DISORDER OF CIRCULATORY SYSTEM: ICD-10-CM

## 2025-02-07 DIAGNOSIS — Z98.890 OTHER SPECIFIED POSTPROCEDURAL STATES: Chronic | ICD-10-CM

## 2025-02-07 DIAGNOSIS — Z90.11 ACQUIRED ABSENCE OF RIGHT BREAST AND NIPPLE: Chronic | ICD-10-CM

## 2025-02-07 DIAGNOSIS — Z98.82 BREAST IMPLANT STATUS: Chronic | ICD-10-CM

## 2025-02-07 DIAGNOSIS — S92.041G: ICD-10-CM

## 2025-02-07 DIAGNOSIS — Z98.42 CATARACT EXTRACTION STATUS, LEFT EYE: Chronic | ICD-10-CM

## 2025-02-07 DIAGNOSIS — Z96.642 PRESENCE OF LEFT ARTIFICIAL HIP JOINT: Chronic | ICD-10-CM

## 2025-02-07 DIAGNOSIS — Q35.9 CLEFT PALATE, UNSPECIFIED: Chronic | ICD-10-CM

## 2025-02-07 DIAGNOSIS — Z98.41 CATARACT EXTRACTION STATUS, RIGHT EYE: Chronic | ICD-10-CM

## 2025-02-07 LAB
ANION GAP SERPL CALC-SCNC: 13 MMOL/L — SIGNIFICANT CHANGE UP (ref 5–17)
BUN SERPL-MCNC: 26 MG/DL — HIGH (ref 7–23)
CALCIUM SERPL-MCNC: 9.5 MG/DL — SIGNIFICANT CHANGE UP (ref 8.4–10.5)
CHLORIDE SERPL-SCNC: 105 MMOL/L — SIGNIFICANT CHANGE UP (ref 96–108)
CO2 SERPL-SCNC: 24 MMOL/L — SIGNIFICANT CHANGE UP (ref 22–31)
CREAT SERPL-MCNC: 1.22 MG/DL — SIGNIFICANT CHANGE UP (ref 0.5–1.3)
EGFR: 43 ML/MIN/1.73M2 — LOW
GLUCOSE SERPL-MCNC: 127 MG/DL — HIGH (ref 70–99)
HCT VFR BLD CALC: 35.6 % — SIGNIFICANT CHANGE UP (ref 34.5–45)
HGB BLD-MCNC: 11.2 G/DL — LOW (ref 11.5–15.5)
MCHC RBC-ENTMCNC: 31.5 G/DL — LOW (ref 32–36)
MCHC RBC-ENTMCNC: 35.6 PG — HIGH (ref 27–34)
MCV RBC AUTO: 113 FL — HIGH (ref 80–100)
NRBC # BLD: 0 /100 WBCS — SIGNIFICANT CHANGE UP (ref 0–0)
NRBC BLD-RTO: 0 /100 WBCS — SIGNIFICANT CHANGE UP (ref 0–0)
PLATELET # BLD AUTO: 642 K/UL — HIGH (ref 150–400)
POTASSIUM SERPL-MCNC: 4.6 MMOL/L — SIGNIFICANT CHANGE UP (ref 3.5–5.3)
POTASSIUM SERPL-SCNC: 4.6 MMOL/L — SIGNIFICANT CHANGE UP (ref 3.5–5.3)
RBC # BLD: 3.15 M/UL — LOW (ref 3.8–5.2)
RBC # FLD: 13.2 % — SIGNIFICANT CHANGE UP (ref 10.3–14.5)
SODIUM SERPL-SCNC: 142 MMOL/L — SIGNIFICANT CHANGE UP (ref 135–145)
WBC # BLD: 9.06 K/UL — SIGNIFICANT CHANGE UP (ref 3.8–10.5)
WBC # FLD AUTO: 9.06 K/UL — SIGNIFICANT CHANGE UP (ref 3.8–10.5)

## 2025-02-07 PROCEDURE — 85027 COMPLETE CBC AUTOMATED: CPT

## 2025-02-07 PROCEDURE — G0463: CPT

## 2025-02-07 PROCEDURE — 76000 FLUOROSCOPY <1 HR PHYS/QHP: CPT

## 2025-02-07 PROCEDURE — 27691 REVISE LOWER LEG TENDON: CPT | Mod: RT

## 2025-02-07 PROCEDURE — 80048 BASIC METABOLIC PNL TOTAL CA: CPT

## 2025-02-07 DEVICE — IMP FDL 5.5MM: Type: IMPLANTABLE DEVICE | Site: RIGHT | Status: FUNCTIONAL

## 2025-02-07 RX ORDER — CEFAZOLIN SODIUM IN 0.9 % NACL 2 G/10 ML
2000 SYRINGE (ML) INTRAVENOUS EVERY 8 HOURS
Refills: 0 | Status: DISCONTINUED | OUTPATIENT
Start: 2025-02-07 | End: 2025-02-07

## 2025-02-07 RX ORDER — HYDROMORPHONE HYDROCHLORIDE 4 MG/ML
0.5 INJECTION, SOLUTION INTRAMUSCULAR; INTRAVENOUS; SUBCUTANEOUS
Refills: 0 | Status: DISCONTINUED | OUTPATIENT
Start: 2025-02-07 | End: 2025-02-07

## 2025-02-07 RX ORDER — LOSARTAN POTASSIUM 100 MG
50 TABLET ORAL DAILY
Refills: 0 | Status: DISCONTINUED | OUTPATIENT
Start: 2025-02-07 | End: 2025-02-10

## 2025-02-07 RX ORDER — OXYCODONE HYDROCHLORIDE 30 MG/1
2.5 TABLET ORAL EVERY 4 HOURS
Refills: 0 | Status: DISCONTINUED | OUTPATIENT
Start: 2025-02-07 | End: 2025-02-07

## 2025-02-07 RX ORDER — ATORVASTATIN CALCIUM 80 MG/1
20 TABLET, FILM COATED ORAL AT BEDTIME
Refills: 0 | Status: DISCONTINUED | OUTPATIENT
Start: 2025-02-07 | End: 2025-02-10

## 2025-02-07 RX ORDER — DIAZEPAM 5 MG
2 TABLET ORAL ONCE
Refills: 0 | Status: DISCONTINUED | OUTPATIENT
Start: 2025-02-07 | End: 2025-02-07

## 2025-02-07 RX ORDER — BACTERIOSTATIC SODIUM CHLORIDE 0.9 %
3 VIAL (ML) INJECTION EVERY 8 HOURS
Refills: 0 | Status: DISCONTINUED | OUTPATIENT
Start: 2025-02-07 | End: 2025-02-07

## 2025-02-07 RX ORDER — TRAMADOL HYDROCHLORIDE 100 MG/1
25 TABLET, EXTENDED RELEASE ORAL EVERY 8 HOURS
Refills: 0 | Status: DISCONTINUED | OUTPATIENT
Start: 2025-02-07 | End: 2025-02-10

## 2025-02-07 RX ORDER — HYDROXYUREA 500 MG
1000 CAPSULE ORAL
Refills: 0 | Status: DISCONTINUED | OUTPATIENT
Start: 2025-02-07 | End: 2025-02-10

## 2025-02-07 RX ORDER — ACETAMINOPHEN 160 MG/5ML
975 SUSPENSION ORAL EVERY 8 HOURS
Refills: 0 | Status: DISCONTINUED | OUTPATIENT
Start: 2025-02-07 | End: 2025-02-10

## 2025-02-07 RX ORDER — PANTOPRAZOLE 20 MG/1
40 TABLET, DELAYED RELEASE ORAL
Refills: 0 | Status: DISCONTINUED | OUTPATIENT
Start: 2025-02-07 | End: 2025-02-10

## 2025-02-07 RX ORDER — SODIUM CHLORIDE 9 G/ML
1000 INJECTION, SOLUTION INTRAVENOUS
Refills: 0 | Status: DISCONTINUED | OUTPATIENT
Start: 2025-02-07 | End: 2025-02-07

## 2025-02-07 RX ORDER — TRAMADOL HYDROCHLORIDE 100 MG/1
50 TABLET, EXTENDED RELEASE ORAL EVERY 8 HOURS
Refills: 0 | Status: DISCONTINUED | OUTPATIENT
Start: 2025-02-07 | End: 2025-02-10

## 2025-02-07 RX ORDER — HYDROXYUREA 500 MG
500 CAPSULE ORAL
Refills: 0 | Status: DISCONTINUED | OUTPATIENT
Start: 2025-02-07 | End: 2025-02-10

## 2025-02-07 RX ORDER — LIDOCAINE HYDROCHLORIDE 10 MG/ML
0.2 INJECTION EPIDURAL; INFILTRATION; INTRACAUDAL ONCE
Refills: 0 | Status: DISCONTINUED | OUTPATIENT
Start: 2025-02-07 | End: 2025-02-07

## 2025-02-07 RX ORDER — ONDANSETRON 4 MG/1
4 TABLET, ORALLY DISINTEGRATING ORAL ONCE
Refills: 0 | Status: DISCONTINUED | OUTPATIENT
Start: 2025-02-07 | End: 2025-02-07

## 2025-02-07 RX ORDER — CEFAZOLIN SODIUM IN 0.9 % NACL 2 G/10 ML
2000 SYRINGE (ML) INTRAVENOUS EVERY 12 HOURS
Refills: 0 | Status: COMPLETED | OUTPATIENT
Start: 2025-02-07 | End: 2025-02-08

## 2025-02-07 RX ORDER — ANTISEPTIC SURGICAL SCRUB 0.04 MG/ML
1 SOLUTION TOPICAL ONCE
Refills: 0 | Status: DISCONTINUED | OUTPATIENT
Start: 2025-02-07 | End: 2025-02-07

## 2025-02-07 RX ORDER — SENNOSIDES 8.6 MG
2 TABLET ORAL AT BEDTIME
Refills: 0 | Status: DISCONTINUED | OUTPATIENT
Start: 2025-02-07 | End: 2025-02-10

## 2025-02-07 RX ORDER — POLYETHYLENE GLYCOL 3350 17 G/17G
17 POWDER, FOR SOLUTION ORAL AT BEDTIME
Refills: 0 | Status: DISCONTINUED | OUTPATIENT
Start: 2025-02-07 | End: 2025-02-10

## 2025-02-07 RX ORDER — ASPIRIN 81 MG/1
81 TABLET, COATED ORAL DAILY
Refills: 0 | Status: DISCONTINUED | OUTPATIENT
Start: 2025-02-07 | End: 2025-02-10

## 2025-02-07 RX ORDER — OXYCODONE HYDROCHLORIDE 30 MG/1
5 TABLET ORAL EVERY 4 HOURS
Refills: 0 | Status: DISCONTINUED | OUTPATIENT
Start: 2025-02-07 | End: 2025-02-07

## 2025-02-07 RX ADMIN — POLYETHYLENE GLYCOL 3350 17 GRAM(S): 17 POWDER, FOR SOLUTION ORAL at 21:26

## 2025-02-07 RX ADMIN — ACETAMINOPHEN 975 MILLIGRAM(S): 160 SUSPENSION ORAL at 17:22

## 2025-02-07 RX ADMIN — ACETAMINOPHEN 975 MILLIGRAM(S): 160 SUSPENSION ORAL at 16:22

## 2025-02-07 RX ADMIN — Medication 2 TABLET(S): at 21:25

## 2025-02-07 RX ADMIN — Medication 100 MILLIGRAM(S): at 21:26

## 2025-02-07 NOTE — PHYSICAL THERAPY INITIAL EVALUATION ADULT - NSPTDMEREC_GEN_A_CORE
Patient will require a rolling walker at home due to their Dx of impaired balance & post sx WB restictions to help complete MRADL's. (Mobility related Activity for daily living)./rolling walker/wheelchair

## 2025-02-07 NOTE — BRIEF OPERATIVE NOTE - NSICDXBRIEFPOSTOP_GEN_ALL_CORE_FT
POST-OP DIAGNOSIS:  Displaced oth fx tuberosity of r calcaneus, init for opn fx 07-Feb-2025 10:02:36  Sara Garcia

## 2025-02-07 NOTE — BRIEF OPERATIVE NOTE - NSICDXBRIEFPREOP_GEN_ALL_CORE_FT
PRE-OP DIAGNOSIS:  Displaced oth fx tuberosity of r calcaneus, init for opn fx 07-Feb-2025 10:02:33  Sara Garcia

## 2025-02-07 NOTE — BRIEF OPERATIVE NOTE - NSICDXBRIEFPROCEDURE_GEN_ALL_CORE_FT
PROCEDURES:  Reconstruction of Achilles tendon with tendon transfer 07-Feb-2025 10:02:48  Sara Garcia

## 2025-02-07 NOTE — PATIENT PROFILE ADULT - FALL HARM RISK - HARM RISK INTERVENTIONS

## 2025-02-07 NOTE — DISCHARGE NOTE PROVIDER - NSDCCPTREATMENT_GEN_ALL_CORE_FT
PRINCIPAL PROCEDURE  Procedure: Reconstruction of Achilles tendon with tendon transfer  Findings and Treatment:

## 2025-02-07 NOTE — DISCHARGE NOTE PROVIDER - NSDCFUSCHEDAPPT_GEN_ALL_CORE_FT
Severiano Short  [facility]  ORTHOSURG [address] [facility]  Scheduled Appointment: 03/04/2025

## 2025-02-07 NOTE — PHYSICAL THERAPY INITIAL EVALUATION ADULT - ADDITIONAL COMMENTS
Patient lives in a private house with 3STE w/B/L handrails and first floor set up. PTA, pt. was independent in ADLs & mobility with SC. 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

## 2025-02-07 NOTE — PHYSICAL THERAPY INITIAL EVALUATION ADULT - MANUAL MUSCLE TESTING RESULTS, REHAB EVAL
At least 3/5 B/L UE and LE; Except R ankle: not assessed ( in splint post sx/grossly assessed due to

## 2025-02-07 NOTE — DISCHARGE NOTE PROVIDER - HOSPITAL COURSE
History of Present Illness    84 year old female presents to PST prior to scheduled ORIF of her right calcaneous fracture, Achilles repair / reconstruction on 2/7/25 with Dr Short. Patient was at home, her dog ran into her and she fell causing pain to her right foot. She was seen at urgent care, then the ER and was told she had a calcaneous fracture. Patient endorses that her foot does not hurt her but every so often she will feel a twinge. PMHx includes TIA x 2 (January 2023 episode of feeling "weird" saw neurologist - takes Plavix A/C medication). Thrombocytosis (followed by hematology), HLD, HTN, OA, b/l breast CA (History of right breast cancer 1988-status post right mastectomy with saline implant reconstruction. History of left breast cancer 7/2015-Stage IA, ER positive/OK positive/HER-2 negative. Status post left breast lumpectomy/sentinel lymph node), former smoker, Lung nodules (currently in lung cancer screening research at [facility] where she receives regular CT scans of her chest, which she states are "fine") and degenerative disc disease. Recently (July 2024) patient underwent left total hip replacement at [facility] without any complications per patient. Overall patient is feeling "fine" denies chest pain / sob / fevers / chills.           Allergies:  	penicillin: Drug, Rash  	adhesives: Miscellaneous, Rash  	dipyridamole: Drug, Unknown, Originally Entered as [Unknown] reaction to [DIPYRIDAMOLE]    PAST MEDICAL HISTORY:    Bilateral hearing loss   DDD (degenerative disc disease), lumbar   History of cancer of right breast   History of constipation   History of left breast cancer   History of sinusitis   History of thrombocytosis   HLD (hyperlipidemia)   Hypertension   Lung nodules   Osteopenia   Primary osteoarthritis of left hip   TIA (transient ischemic attack).     PAST SURGICAL HISTORY    Cleft palate   History of appendectomy   History of left cataract extraction   History of lumpectomy of left breast   History of right breast implant   History of right cataract extraction   History of total mastectomy of right breast   S/P hip replacement, left   Status post functional endoscopic sinus surgery (FESS)     Home Medications:     aspirin 81 mg oral tablet: Last Dose Taken:  , orally once a day  · 	folic acid 1 mg oral tablet: Last Dose Taken:  , 1 tab(s) orally once a day  · 	calcium (as carbonate) 600 mg oral tablet: Last Dose Taken:  , 2 tab(s) orally once a day  · 	hydroxyurea 500 mg oral capsule: Last Dose Taken:  , 15 mg/kg orally once a day on Monday and Wednesday takes 2 capsules  ·         D3 25 mcg (1000 intl units) oral tablet: Last Dose Taken:  , 1 tab(s) orally once a day    Hospital Course:    2/7:  Patient admitted and underwent a reconstruction Achilles Tendon with Dr Short.  Patient tolerated the procedure well, no complications noted, and was transferred to the recovery room in stable condition,     Patient was placed on Lovenox 40mg daily for DVT ppx, and Protonix for GI protection.   Patient was made  non-weight bearing as tolerated on the operative leg (R)     Physical & occupational therapy modalities post surgery were performed by PT/OT  including ambulation training, range of motion, ADL's, and transfers.  Patient recommended for  home with home PT  Discharge and Orthopedic Care instructions were delineated in the Discharge Plan and reviewed with the patient.   Patient was deemed stable from an Orthopedic & medical standpoint for discharge ***######## History of Present Illness    84 year old female presents to PST prior to scheduled ORIF of her right calcaneous fracture, Achilles repair / reconstruction on 2/7/25 with Dr Short. Patient was at home, her dog ran into her and she fell causing pain to her right foot. She was seen at urgent care, then the ER and was told she had a calcaneous fracture. Patient endorses that her foot does not hurt her but every so often she will feel a twinge. PMHx includes TIA x 2 (January 2023 episode of feeling "weird" saw neurologist - takes Plavix A/C medication). Thrombocytosis (followed by hematology), HLD, HTN, OA, b/l breast CA (History of right breast cancer 1988-status post right mastectomy with saline implant reconstruction. History of left breast cancer 7/2015-Stage IA, ER positive/OK positive/HER-2 negative. Status post left breast lumpectomy/sentinel lymph node), former smoker, Lung nodules (currently in lung cancer screening research at [facility] where she receives regular CT scans of her chest, which she states are "fine") and degenerative disc disease. Recently (July 2024) patient underwent left total hip replacement at [facility] without any complications per patient. Overall patient is feeling "fine" denies chest pain / sob / fevers / chills.           Allergies:  	penicillin: Drug, Rash  	adhesives: Miscellaneous, Rash  	dipyridamole: Drug, Unknown, Originally Entered as [Unknown] reaction to [DIPYRIDAMOLE]    PAST MEDICAL HISTORY:    Bilateral hearing loss   DDD (degenerative disc disease), lumbar   History of cancer of right breast   History of constipation   History of left breast cancer   History of sinusitis   History of thrombocytosis   HLD (hyperlipidemia)   Hypertension   Lung nodules   Osteopenia   Primary osteoarthritis of left hip   TIA (transient ischemic attack).     PAST SURGICAL HISTORY    Cleft palate   History of appendectomy   History of left cataract extraction   History of lumpectomy of left breast   History of right breast implant   History of right cataract extraction   History of total mastectomy of right breast   S/P hip replacement, left   Status post functional endoscopic sinus surgery (FESS)     Home Medications:     aspirin 81 mg oral tablet: Last Dose Taken:  , orally once a day  · 	folic acid 1 mg oral tablet: Last Dose Taken:  , 1 tab(s) orally once a day  · 	calcium (as carbonate) 600 mg oral tablet: Last Dose Taken:  , 2 tab(s) orally once a day  · 	hydroxyurea 500 mg oral capsule: Last Dose Taken:  , 15 mg/kg orally once a day on Monday and Wednesday takes 2 capsules  ·         D3 25 mcg (1000 intl units) oral tablet: Last Dose Taken:  , 1 tab(s) orally once a day    Hospital Course:    2/7:  Patient admitted and underwent a reconstruction Achilles Tendon with Dr Short.  Patient tolerated the procedure well, no complications noted, and was transferred to the recovery room in stable condition,     Patient was placed on Lovenox 40mg daily for DVT ppx, and Protonix for GI protection.   Patient was made  non-weight bearing as tolerated on the operative leg (R)     Physical & occupational therapy modalities post surgery were performed by PT/OT  including ambulation training, range of motion, ADL's, and transfers.  Patient recommended for  home with home PT.  Discharge and Orthopedic Care instructions were delineated in the Discharge Plan and reviewed with the patient.   Patient was deemed stable from an Orthopedic & medical standpoint for discharge on 2/10/2025. History of Present Illness    84 year old female underwent R Achilles reconstruction on 2/7/25 with Dr Short. Patient was at home, her dog ran into her and she fell causing pain to her right foot. She was seen at urgent care, then the ER and was told she had a calcaneous fracture. Patient endorses that her foot does not hurt her but every so often she will feel a twinge. PMHx includes TIA x 2 (January 2023 episode of feeling "weird" saw neurologist - takes Plavix A/C medication). Thrombocytosis (followed by hematology), HLD, HTN, OA, b/l breast CA (History of right breast cancer 1988-status post right mastectomy with saline implant reconstruction. History of left breast cancer 7/2015-Stage IA, ER positive/NC positive/HER-2 negative. Status post left breast lumpectomy/sentinel lymph node), former smoker, Lung nodules (currently in lung cancer screening research at [facility] where she receives regular CT scans of her chest, which she states are "fine") and degenerative disc disease. Recently (July 2024) patient underwent left total hip replacement at [facility] without any complications per patient. Overall patient is feeling "fine" denies chest pain / sob / fevers / chills.           Allergies:  	penicillin: Drug, Rash  	adhesives: Miscellaneous, Rash  	dipyridamole: Drug, Unknown, Originally Entered as [Unknown] reaction to [DIPYRIDAMOLE]    PAST MEDICAL HISTORY:    Bilateral hearing loss   DDD (degenerative disc disease), lumbar   History of cancer of right breast   History of constipation   History of left breast cancer   History of sinusitis   History of thrombocytosis   HLD (hyperlipidemia)   Hypertension   Lung nodules   Osteopenia   Primary osteoarthritis of left hip   TIA (transient ischemic attack).     PAST SURGICAL HISTORY    Cleft palate   History of appendectomy   History of left cataract extraction   History of lumpectomy of left breast   History of right breast implant   History of right cataract extraction   History of total mastectomy of right breast   S/P hip replacement, left   Status post functional endoscopic sinus surgery (FESS)     Home Medications:     aspirin 81 mg oral tablet: Last Dose Taken:  , orally once a day  · 	folic acid 1 mg oral tablet: Last Dose Taken:  , 1 tab(s) orally once a day  · 	calcium (as carbonate) 600 mg oral tablet: Last Dose Taken:  , 2 tab(s) orally once a day  · 	hydroxyurea 500 mg oral capsule: Last Dose Taken:  , 15 mg/kg orally once a day on Monday and Wednesday takes 2 capsules  ·         D3 25 mcg (1000 intl units) oral tablet: Last Dose Taken:  , 1 tab(s) orally once a day    Hospital Course:    2/7:  Patient admitted and underwent a reconstruction Achilles Tendon with Dr Short.  Patient tolerated the procedure well, no complications noted, and was transferred to the recovery room in stable condition,     Patient was placed on Lovenox 40mg daily for DVT ppx, and Protonix for GI protection.   Patient was made non-weight bearing with short leg cast on the operative leg (R)     Physical & occupational therapy modalities post surgery were performed by PT/OT  including ambulation training with cast, ADL's, and transfers.  Patient recommended for home with home PT.  Discharge and Orthopedic Care instructions were delineated in the Discharge Plan and reviewed with the patient.   Patient was deemed stable from an Orthopedic & medical standpoint for discharge on 2/10/2025.

## 2025-02-07 NOTE — PHYSICAL THERAPY INITIAL EVALUATION ADULT - PERTINENT HX OF CURRENT PROBLEM, REHAB EVAL
4 year old female presents to PST prior to scheduled ORIF of her right calcaneous fracture, Achilles repair / reconstruction on 2/7/25 with Dr Short. Patient was at home, her dog ran into her and she fell causing pain to her right foot. She was seen at urgent care, then the ER and was told she had a calcaneous fracture. Patient endorses that her foot does not hurt her but every so often she will feel a twinge. PMHx includes TIA x 2 (January 2023 episode of feeling "weird" saw neurologist - takes Plavix A/C medication). Thrombocytosis (followed by hematology), HLD, HTN, OA, b/l breast CA (History of right breast cancer 1988-status post right mastectomy with saline implant reconstruction. History of left breast cancer 7/2015-Stage IA, ER positive/AK positive/HER-2 negative. Hospital course:  s/p Reconstruction R Achilias tendon with transfer (2/7)

## 2025-02-07 NOTE — DISCHARGE NOTE PROVIDER - NSDCMRMEDTOKEN_GEN_ALL_CORE_FT
acetaminophen 500 mg oral tablet: 2 tab(s) orally every 8 hours  aspirin 81 mg oral tablet: orally once a day  atorvastatin 20 mg oral tablet: 1 tab(s) orally once a day  calcium (as carbonate) 600 mg oral tablet: 2 tab(s) orally once a day  clopidogrel 75 mg oral tablet: 1 tab(s) orally once a day  D3 25 mcg (1000 intl units) oral tablet: 1 tab(s) orally once a day  folic acid 1 mg oral tablet: 1 tab(s) orally once a day  hydroxyurea 500 mg oral capsule: 15 mg/kg orally once a day on Monday and Wednesday takes 2 capsules  olmesartan 20 mg oral tablet: 1 tab(s) orally once a day  PreserVision AREDS 2 oral capsule: orally 2 times a day  vitamin B12 1000 mcg daily:   vitamin B6 100 mg daily:    acetaminophen 500 mg oral tablet: 2 tab(s) orally every 8 hours as needed for fever, H/A, mild pain  aspirin 81 mg oral delayed release tablet: 1 tab(s) orally once a day  atorvastatin 20 mg oral tablet: 1 tab(s) orally once a day  calcium (as carbonate) 600 mg oral tablet: 2 tab(s) orally once a day  clopidogrel 75 mg oral tablet: 1 tab(s) orally once a day  D3 25 mcg (1000 intl units) oral tablet: 1 tab(s) orally once a day  folic acid 1 mg oral tablet: 1 tab(s) orally once a day  hydroxyurea 500 mg oral capsule: 15 mg/kg orally once a day on Monday and Wednesday takes 2 capsules  olmesartan 20 mg oral tablet: 1 tab(s) orally once a day  POLY-FLY LIGHTWEIGHT WHEEL CHAIR: DX:CALCANEOUS FRACTURE   NADIA:6M  PreserVision AREDS 2 oral capsule: orally 2 times a day  ROLLING WALKER: DX: CALCANEOUS FRACTURE    NADIA:99M  traMADol 50 mg oral tablet: 1 tab(s) orally every 8 hours As needed Severe Pain (7 - 10)  vitamin B12 1000 mcg daily:   vitamin B6 100 mg daily:    acetaminophen 500 mg oral tablet: 2 tab(s) orally every 8 hours X 5 days, then as needed for mild pain  aspirin 81 mg oral delayed release tablet: 1 tab(s) orally once a day  atorvastatin 20 mg oral tablet: 1 tab(s) orally once a day  calcium (as carbonate) 600 mg oral tablet: 2 tab(s) orally once a day  clopidogrel 75 mg oral tablet: 1 tab(s) orally once a day  D3 25 mcg (1000 intl units) oral tablet: 1 tab(s) orally once a day  folic acid 1 mg oral tablet: 1 tab(s) orally once a day  hydroxyurea 500 mg oral capsule: 15 mg/kg orally once a day on Monday and Wednesday takes 2 capsules  olmesartan 20 mg oral tablet: 1 tab(s) orally once a day  pantoprazole 40 mg oral delayed release tablet: 1 tab(s) orally once a day before breakfast  POLY-FLY LIGHTWEIGHT WHEEL CHAIR: DX:CALCANEOUS FRACTURE   NADIA:6M  polyethylene glycol 3350 oral powder for reconstitution: 17 gram(s) orally once a day (at bedtime)  PreserVision AREDS 2 oral capsule: orally 2 times a day  ROLLING WALKER: DX: CALCANEOUS FRACTURE    NADIA:99M  senna leaf extract oral tablet: 2 tab(s) orally once a day (at bedtime)  traMADol 50 mg oral tablet: 0.5 tab(s) orally every 8 hours As needed Moderate Pain (4 - 6)  traMADol 50 mg oral tablet: 1 tab(s) orally every 8 hours As needed Severe Pain (7 - 10)  vitamin B12 1000 mcg daily:   vitamin B6 100 mg daily:

## 2025-02-07 NOTE — DISCHARGE NOTE PROVIDER - NSDCFUADDINST_GEN_ALL_CORE_FT
1. Keep Splint clean and dry   2. ice to affected incision every 4-6 hours x 72 hours   3. elevate extremity while at rest  4. Activity: NON weight bearing R Leg  5. DVT Prophylaxis: [id number]######################    6. sponge bath only until OK w/ Surgeon  Call MD for excessive pain, persistent fevers, pain NOT relieved by medications.  Do not drive or lift any heavy objects   7. Follow up with Dr Short  for dressing REMOVAL, wound check, and suture removal   Please call for an appointment  1. Keep Splint clean and dry at all times  2. ice to affected incision every 4-6 hours x 72 hours   3. elevate extremity while at rest  4. Activity: NON weight bearing R Leg  5. DVT Prophylaxis: Continue your home regimen of Plavix 75mg oral daily and Aspirin 81mg oral daily  6. Sponge bath only   Call MD for excessive pain, persistent fevers, pain NOT relieved by medications.  Do not drive or lift any heavy objects   7. Follow up with Dr Short upon discharge from rehab for dressing REMOVAL, wound check, and suture removal - Please call for an appointment  1. Keep Splint clean and dry at all times  2. ice to affected incision every 4-6 hours x 72 hours   3. elevate extremity while at rest  4. Activity: NON weight bearing R Leg  5. DVT Prophylaxis: Continue your home regimen of Plavix 75mg oral daily and Aspirin 81mg oral daily  6. Sponge bath only   Call MD for excessive pain, persistent fevers, pain NOT relieved by medications.  Do not drive or lift any heavy objects   7. Follow up with Dr Short in 10-14 days for dressing REMOVAL, wound check, and suture removal - Please call for an appointment  1. Keep cast clean and dry at all times  2. ice to right LE every 4-6 hours x 72 hours   3. elevate extremity while at rest  4. Activity: NON weight bearing R Leg  5. DVT Prophylaxis: Continue your home regimen of Plavix 75mg oral daily and Aspirin 81mg oral daily  6. Sponge bath only   Call MD for excessive pain, persistent fevers, pain NOT relieved by medications.  Do not drive or lift any heavy objects   7. Follow up with Dr Short in 10-14 days for dressing REMOVAL, wound check, and suture removal - Please call for an appointment

## 2025-02-07 NOTE — DISCHARGE NOTE PROVIDER - NSDCACTIVITY_GEN_ALL_CORE
Do not drive or operate machinery/Stairs allowed/Walking - Indoors allowed/No heavy lifting/straining/Walking - Outdoors allowed Do not drive or operate machinery/Walking - Indoors allowed/No heavy lifting/straining

## 2025-02-07 NOTE — PHYSICAL THERAPY INITIAL EVALUATION ADULT - NSPTDISCHREC_GEN_A_CORE
w/24 hrs x 7 days support from nurse hired by patient; [place] to negotiate 3 stairs outside house/Home PT

## 2025-02-07 NOTE — DISCHARGE NOTE PROVIDER - NSDCCPCAREPLAN_GEN_ALL_CORE_FT
PRINCIPAL DISCHARGE DIAGNOSIS  Diagnosis: Displaced oth fx tuberosity of r calcaneus, init for opn fx  Assessment and Plan of Treatment:

## 2025-02-07 NOTE — DISCHARGE NOTE PROVIDER - NSDCFUADDAPPT_GEN_ALL_CORE_FT
Follow up with your private internist / PMD in 4-6 weeks re: general checkup (and possible medication adjustment)   Please call for an appointment  Followup with Dr. Short in 10-14 days for cast/suture removal/wound check - please call to make appointment.   Follow up with your private internist / PMD in 4-6 weeks re: general checkup (and possible medication adjustment)   Please call for an appointment  Followup with Dr. Short in 10-14 days for wound check in cast - please call to make appointment.   Follow up with your private internist / PMD in 4-6 weeks re: general checkup (and possible medication adjustment)   Please call for an appointment

## 2025-02-07 NOTE — DISCHARGE NOTE PROVIDER - CARE PROVIDER_API CALL
Severiano Short  Orthopaedic Surgery  [address]  [place] [address]  Phone: [phone]  Fax: [phone]  Follow Up Time:

## 2025-02-07 NOTE — PHYSICAL THERAPY INITIAL EVALUATION ADULT - ACTIVE RANGE OF MOTION EXAMINATION, REHAB EVAL
Except R ankle: not assessed ( in splint post sx)/bilateral upper extremity Active ROM was WFL (within functional limits)/bilateral  lower extremity Active ROM was WFL (within functional limits)

## 2025-02-07 NOTE — PATIENT PROFILE ADULT - FUNCTIONAL ASSESSMENT - DAILY ACTIVITY 1.
Render Risk Assessment In Note?: no Detail Level: Simple Additional Notes: Patient consent was obtained to proceed with the visit and recommended plan of care after discussion of all risks and benefits, including the risks of COVID-19 exposure. 4 = No assist / stand by assistance

## 2025-02-08 LAB
ANION GAP SERPL CALC-SCNC: 9 MMOL/L — SIGNIFICANT CHANGE UP (ref 5–17)
ANISOCYTOSIS BLD QL: SLIGHT — SIGNIFICANT CHANGE UP
BASOPHILS # BLD AUTO: 0.15 K/UL — SIGNIFICANT CHANGE UP (ref 0–0.2)
BASOPHILS NFR BLD AUTO: 1.7 % — SIGNIFICANT CHANGE UP (ref 0–2)
BUN SERPL-MCNC: 23 MG/DL — SIGNIFICANT CHANGE UP (ref 7–23)
CALCIUM SERPL-MCNC: 8.9 MG/DL — SIGNIFICANT CHANGE UP (ref 8.4–10.5)
CHLORIDE SERPL-SCNC: 109 MMOL/L — HIGH (ref 96–108)
CO2 SERPL-SCNC: 25 MMOL/L — SIGNIFICANT CHANGE UP (ref 22–31)
CREAT SERPL-MCNC: 1.2 MG/DL — SIGNIFICANT CHANGE UP (ref 0.5–1.3)
EGFR: 44 ML/MIN/1.73M2 — LOW
EOSINOPHIL # BLD AUTO: 0 K/UL — SIGNIFICANT CHANGE UP (ref 0–0.5)
EOSINOPHIL NFR BLD AUTO: 0 % — SIGNIFICANT CHANGE UP (ref 0–6)
GLUCOSE SERPL-MCNC: 107 MG/DL — HIGH (ref 70–99)
HCT VFR BLD CALC: 27.3 % — LOW (ref 34.5–45)
HGB BLD-MCNC: 8.8 G/DL — LOW (ref 11.5–15.5)
LYMPHOCYTES # BLD AUTO: 0.76 K/UL — LOW (ref 1–3.3)
LYMPHOCYTES # BLD AUTO: 8.6 % — LOW (ref 13–44)
MACROCYTES BLD QL: SLIGHT — SIGNIFICANT CHANGE UP
MANUAL SMEAR VERIFICATION: SIGNIFICANT CHANGE UP
MCHC RBC-ENTMCNC: 32.2 G/DL — SIGNIFICANT CHANGE UP (ref 32–36)
MCHC RBC-ENTMCNC: 34.9 PG — HIGH (ref 27–34)
MCV RBC AUTO: 108.3 FL — HIGH (ref 80–100)
MONOCYTES # BLD AUTO: 0.31 K/UL — SIGNIFICANT CHANGE UP (ref 0–0.9)
MONOCYTES NFR BLD AUTO: 3.5 % — SIGNIFICANT CHANGE UP (ref 2–14)
NEUTROPHILS # BLD AUTO: 7.59 K/UL — HIGH (ref 1.8–7.4)
NEUTROPHILS NFR BLD AUTO: 86.2 % — HIGH (ref 43–77)
PLAT MORPH BLD: NORMAL — SIGNIFICANT CHANGE UP
PLATELET # BLD AUTO: 559 K/UL — HIGH (ref 150–400)
POIKILOCYTOSIS BLD QL AUTO: SLIGHT — SIGNIFICANT CHANGE UP
POTASSIUM SERPL-MCNC: 4.1 MMOL/L — SIGNIFICANT CHANGE UP (ref 3.5–5.3)
POTASSIUM SERPL-SCNC: 4.1 MMOL/L — SIGNIFICANT CHANGE UP (ref 3.5–5.3)
RBC # BLD: 2.52 M/UL — LOW (ref 3.8–5.2)
RBC # FLD: 13.2 % — SIGNIFICANT CHANGE UP (ref 10.3–14.5)
RBC BLD AUTO: SIGNIFICANT CHANGE UP
SODIUM SERPL-SCNC: 143 MMOL/L — SIGNIFICANT CHANGE UP (ref 135–145)
WBC # BLD: 8.81 K/UL — SIGNIFICANT CHANGE UP (ref 3.8–10.5)
WBC # FLD AUTO: 8.81 K/UL — SIGNIFICANT CHANGE UP (ref 3.8–10.5)

## 2025-02-08 RX ORDER — TRAMADOL HYDROCHLORIDE 100 MG/1
1 TABLET, EXTENDED RELEASE ORAL
Qty: 0 | Refills: 0 | DISCHARGE
Start: 2025-02-08

## 2025-02-08 RX ORDER — ASPIRIN 81 MG/1
1 TABLET, COATED ORAL
Qty: 0 | Refills: 0 | DISCHARGE
Start: 2025-02-08

## 2025-02-08 RX ADMIN — Medication 500 MILLIGRAM(S): at 09:34

## 2025-02-08 RX ADMIN — ACETAMINOPHEN 975 MILLIGRAM(S): 160 SUSPENSION ORAL at 05:44

## 2025-02-08 RX ADMIN — Medication 100 MILLIGRAM(S): at 09:54

## 2025-02-08 RX ADMIN — Medication 75 MILLIGRAM(S): at 11:18

## 2025-02-08 RX ADMIN — Medication 50 MILLIGRAM(S): at 05:45

## 2025-02-08 RX ADMIN — ACETAMINOPHEN 975 MILLIGRAM(S): 160 SUSPENSION ORAL at 06:15

## 2025-02-08 RX ADMIN — POLYETHYLENE GLYCOL 3350 17 GRAM(S): 17 POWDER, FOR SOLUTION ORAL at 21:41

## 2025-02-08 RX ADMIN — ACETAMINOPHEN 975 MILLIGRAM(S): 160 SUSPENSION ORAL at 13:26

## 2025-02-08 RX ADMIN — ACETAMINOPHEN 975 MILLIGRAM(S): 160 SUSPENSION ORAL at 21:38

## 2025-02-08 RX ADMIN — ASPIRIN 81 MILLIGRAM(S): 81 TABLET, COATED ORAL at 11:19

## 2025-02-08 RX ADMIN — PANTOPRAZOLE 40 MILLIGRAM(S): 20 TABLET, DELAYED RELEASE ORAL at 05:45

## 2025-02-08 RX ADMIN — Medication 2 TABLET(S): at 21:38

## 2025-02-08 RX ADMIN — ATORVASTATIN CALCIUM 20 MILLIGRAM(S): 80 TABLET, FILM COATED ORAL at 05:45

## 2025-02-08 RX ADMIN — ACETAMINOPHEN 975 MILLIGRAM(S): 160 SUSPENSION ORAL at 14:26

## 2025-02-08 NOTE — OCCUPATIONAL THERAPY INITIAL EVALUATION ADULT - TOILETING, PREVIOUS LEVEL OF FUNCTION, OT EVAL
O-Z Flap Text: The defect edges were debeveled with a #15 scalpel blade.  Given the location of the defect, shape of the defect and the proximity to free margins an O-Z flap was deemed most appropriate.  Using a sterile surgical marker, an appropriate transposition flap was drawn incorporating the defect and placing the expected incisions within the relaxed skin tension lines where possible. The area thus outlined was incised deep to adipose tissue with a #15 scalpel blade.  The skin margins were undermined to an appropriate distance in all directions utilizing iris scissors. independent

## 2025-02-08 NOTE — OCCUPATIONAL THERAPY INITIAL EVALUATION ADULT - BALANCE TRAINING, PT EVAL
GOAL: pt will increase sitting/standing balance by one grade within 4 weeks while maintaining NWB to RLE in order to increase ability to participate in ADLs and functional tasks.

## 2025-02-08 NOTE — OCCUPATIONAL THERAPY INITIAL EVALUATION ADULT - PERTINENT HX OF CURRENT PROBLEM, REHAB EVAL
84 year old female presents to PST prior to scheduled ORIF of her right calcaneous fracture, Achilles repair / reconstruction on 2/7/25 with Dr Short. Patient was at home, her dog ran into her and she fell causing pain to her right foot. She was seen at urgent care, then the ER and was told she had a calcaneous fracture. Patient endorses that her foot does not hurt her but every so often she will feel a twinge. PMHx includes TIA x 2 (January 2023 episode of feeling "weird" saw neurologist - takes Plavix A/C medication). Thrombocytosis (followed by hematology), HLD, HTN, OA, b/l breast CA (History of right breast cancer 1988-status post right mastectomy with saline implant reconstruction. History of left breast cancer 7/2015-Stage IA, ER positive/[place] positive/HER-2 negative. Status post left breast lumpectomy/sentinel lymph node), former smoker, Lung nodules (currently in lung cancer screening research at [facility] where she receives regular CT scans of her chest, which she states are "fine") and degenerative disc disease. Recently (July 2024) patient underwent left total hip replacement at [facility] without any complications per patient. Overall patient is feeling "fine" denies chest pain / sob / fevers / chills.

## 2025-02-08 NOTE — OCCUPATIONAL THERAPY INITIAL EVALUATION ADULT - BALANCE DISTURBANCE, SYSTEM IMPAIRMENT CONTRIBUTE, REHAB EVAL
Initial HPRP:   Patient on report as discharged from [facility] ED Visit 3/23/22 for Acute Right - Sided Low Back Pain . Initial attempt to contact patient for transitions of care.  Left discreet message on voicemail with this Care Coordinator's contact information.  Will attempt outreach on 3/25/22.      cyclobenzaprine (FLEXERIL) 10 mg tablet    Call your doctor now or seek immediate medical care if:  You have new or worsening numbness in your legs. You have new or worsening weakness in your legs. (This could make it hard to stand up.)  You lose control of your bladder or bowels. You have a fever, lose weight, or don't feel well. musculoskeletal

## 2025-02-08 NOTE — OCCUPATIONAL THERAPY INITIAL EVALUATION ADULT - ADDITIONAL COMMENTS
no concerns Patient lives in a private house with 3STE w/B/L handrails and first floor set up. PTA, pt. was independent in ADLs & mobility with SC. 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

## 2025-02-08 NOTE — OCCUPATIONAL THERAPY INITIAL EVALUATION ADULT - LOWER BODY DRESSING, PREVIOUS LEVEL OF FUNCTION, OT EVAL
Patient states she took 4 pregnancy tests which were all positive. States she is high risk and also received the 1st dose of the Pfizer Vaccine not long ago. Would like nurse to call back, wanting labs ordered. Patient made aware Jeroh and staff are out of office,  But is wanting to know if someone else could order labs.     Ph. [phone]   independent

## 2025-02-08 NOTE — OCCUPATIONAL THERAPY INITIAL EVALUATION ADULT - TRANSFER TRAINING, PT EVAL
GOAL: Pt will be independent with all functional transfers while maintaining NWB to RLE in 4 weeks to increase participation in functional mobility tasks.

## 2025-02-09 RX ADMIN — ASPIRIN 81 MILLIGRAM(S): 81 TABLET, COATED ORAL at 11:43

## 2025-02-09 RX ADMIN — ACETAMINOPHEN 975 MILLIGRAM(S): 160 SUSPENSION ORAL at 05:31

## 2025-02-09 RX ADMIN — Medication 500 MILLIGRAM(S): at 07:59

## 2025-02-09 RX ADMIN — ACETAMINOPHEN 975 MILLIGRAM(S): 160 SUSPENSION ORAL at 22:27

## 2025-02-09 RX ADMIN — POLYETHYLENE GLYCOL 3350 17 GRAM(S): 17 POWDER, FOR SOLUTION ORAL at 21:26

## 2025-02-09 RX ADMIN — Medication 50 MILLIGRAM(S): at 05:32

## 2025-02-09 RX ADMIN — PANTOPRAZOLE 40 MILLIGRAM(S): 20 TABLET, DELAYED RELEASE ORAL at 05:31

## 2025-02-09 RX ADMIN — ACETAMINOPHEN 975 MILLIGRAM(S): 160 SUSPENSION ORAL at 06:37

## 2025-02-09 RX ADMIN — Medication 2 TABLET(S): at 21:26

## 2025-02-09 RX ADMIN — ACETAMINOPHEN 975 MILLIGRAM(S): 160 SUSPENSION ORAL at 14:01

## 2025-02-09 RX ADMIN — ACETAMINOPHEN 975 MILLIGRAM(S): 160 SUSPENSION ORAL at 13:31

## 2025-02-09 RX ADMIN — ACETAMINOPHEN 975 MILLIGRAM(S): 160 SUSPENSION ORAL at 21:27

## 2025-02-09 RX ADMIN — Medication 75 MILLIGRAM(S): at 11:43

## 2025-02-09 NOTE — PROGRESS NOTE ADULT - ASSESSMENT
A/P: 85 y/o F POD#2 s/p Reconstruction of Achilles tendon with tendon transfer    DVT ppx- Plavix 75mg PO Daily, ASA 81mg PO Daily  RLE NWB  Pain management prn  PT  OT  Gi ppx  Discharge planning to [facility]  Orthopedic Surgery  [phone]  
83 y/o FM s/p right Achillis tendon reconstruction, transfer POD#1, PT, OT for NWB  right L/E, d/c planning  Halley Mchugh PA-C  Orthopaedic Surgery  Team pager [phone]  [facility] [phone]  [phone]

## 2025-02-10 ENCOUNTER — TRANSCRIPTION ENCOUNTER (OUTPATIENT)
Age: 85
End: 2025-02-10

## 2025-02-10 VITALS
HEART RATE: 79 BPM | OXYGEN SATURATION: 98 % | SYSTOLIC BLOOD PRESSURE: 121 MMHG | TEMPERATURE: 98 F | DIASTOLIC BLOOD PRESSURE: 64 MMHG | RESPIRATION RATE: 18 BRPM

## 2025-02-10 PROCEDURE — 97530 THERAPEUTIC ACTIVITIES: CPT

## 2025-02-10 PROCEDURE — 80048 BASIC METABOLIC PNL TOTAL CA: CPT

## 2025-02-10 PROCEDURE — 85025 COMPLETE CBC W/AUTO DIFF WBC: CPT

## 2025-02-10 PROCEDURE — 97166 OT EVAL MOD COMPLEX 45 MIN: CPT

## 2025-02-10 PROCEDURE — 97116 GAIT TRAINING THERAPY: CPT

## 2025-02-10 PROCEDURE — C1713: CPT

## 2025-02-10 PROCEDURE — 97110 THERAPEUTIC EXERCISES: CPT

## 2025-02-10 PROCEDURE — 85027 COMPLETE CBC AUTOMATED: CPT

## 2025-02-10 PROCEDURE — 97161 PT EVAL LOW COMPLEX 20 MIN: CPT

## 2025-02-10 PROCEDURE — C9399: CPT

## 2025-02-10 RX ORDER — SENNOSIDES 8.6 MG
2 TABLET ORAL
Qty: 0 | Refills: 0 | DISCHARGE
Start: 2025-02-10

## 2025-02-10 RX ORDER — PANTOPRAZOLE 20 MG/1
1 TABLET, DELAYED RELEASE ORAL
Qty: 0 | Refills: 0 | DISCHARGE
Start: 2025-02-10

## 2025-02-10 RX ORDER — TRAMADOL HYDROCHLORIDE 100 MG/1
0.5 TABLET, EXTENDED RELEASE ORAL
Qty: 0 | Refills: 0 | DISCHARGE
Start: 2025-02-10

## 2025-02-10 RX ORDER — POLYETHYLENE GLYCOL 3350 17 G/17G
17 POWDER, FOR SOLUTION ORAL
Qty: 0 | Refills: 0 | DISCHARGE
Start: 2025-02-10

## 2025-02-10 RX ADMIN — ASPIRIN 81 MILLIGRAM(S): 81 TABLET, COATED ORAL at 11:28

## 2025-02-10 RX ADMIN — ACETAMINOPHEN 975 MILLIGRAM(S): 160 SUSPENSION ORAL at 14:05

## 2025-02-10 RX ADMIN — PANTOPRAZOLE 40 MILLIGRAM(S): 20 TABLET, DELAYED RELEASE ORAL at 05:05

## 2025-02-10 RX ADMIN — ACETAMINOPHEN 975 MILLIGRAM(S): 160 SUSPENSION ORAL at 06:05

## 2025-02-10 RX ADMIN — ACETAMINOPHEN 975 MILLIGRAM(S): 160 SUSPENSION ORAL at 05:05

## 2025-02-10 RX ADMIN — Medication 50 MILLIGRAM(S): at 05:06

## 2025-02-10 RX ADMIN — Medication 1000 MILLIGRAM(S): at 09:00

## 2025-02-10 RX ADMIN — Medication 75 MILLIGRAM(S): at 11:28

## 2025-02-10 RX ADMIN — ATORVASTATIN CALCIUM 20 MILLIGRAM(S): 80 TABLET, FILM COATED ORAL at 05:05

## 2025-02-10 RX ADMIN — ACETAMINOPHEN 975 MILLIGRAM(S): 160 SUSPENSION ORAL at 15:05

## 2025-02-10 NOTE — DISCHARGE NOTE NURSING/CASE MANAGEMENT/SOCIAL WORK - FINANCIAL ASSISTANCE
[facility] provides services at a reduced cost to those who are determined to be eligible through [facility]’s financial assistance program. Information regarding [facility]’s financial assistance program can be found by going to [email or web address] or by calling [phone].

## 2025-02-10 NOTE — PROGRESS NOTE ADULT - SUBJECTIVE AND OBJECTIVE BOX
Patient resting without complaints.  No chest pain, SOB, N/V.      T(C): 36.9 (02-09-25 @ 08:02), Max: 36.9 (02-09-25 @ 08:02)  HR: 90 (02-09-25 @ 08:02) (67 - 90)  BP: 135/73 (02-09-25 @ 08:02) (128/82 - 151/70)  RR: 18 (02-09-25 @ 08:02) (18 - 18)  SpO2: 96% (02-09-25 @ 08:02) (96% - 97%)      Exam:  Gen: Alert and Oriented, No Acute Distress  Lower Extremities:  RLE: SLC C/D/I, compartments soft, dull sensation grossly intact, toes warm and mobile    Labs:                        8.8    8.81  )-----------( 559      ( 08 Feb 2025 06:03 )             27.3    02-08    143  |  109[H]  |  23  ----------------------------<  107[H]  4.1   |  25  |  1.20    Ca    8.9      08 Feb 2025 06:03    
s/p achilles tendon reconstruction, FHL transfer     SUBJECTIVE  No acute events overnight. Pain controlled. Denies fevers/chills, chest pain, or dyspnea.    OBJECTIVE  Vital Signs Last 24 Hrs  T(C): 36.5 (10 Feb 2025 04:49), Max: 37 (09 Feb 2025 16:27)  T(F): 97.7 (10 Feb 2025 04:49), Max: 98.6 (09 Feb 2025 16:27)  HR: 65 (10 Feb 2025 04:49) (64 - 90)  BP: 154/71 (10 Feb 2025 04:49) (133/64 - 154/71)  BP(mean): --  RR: 18 (10 Feb 2025 04:49) (18 - 18)  SpO2: 97% (10 Feb 2025 04:49) (96% - 99%)    Parameters below as of 10 Feb 2025 04:49  Patient On (Oxygen Delivery Method): room air        PHYSICAL EXAM  Gen: Lying in bed, NAD  Resp: even, nonlabored breathing  RLE:  SLC (univalved)  compartments soft, no calf TTP b/l  Motor: Toe flexion/extension intact  SILT  +DP pulse, WWP        ASSESSMENT & PLAN  84yFemale s/p achilles tendon reconstruction w/ FHL transfer on 2/7, POD 3. Recovering appropriately, pain controlled.     -DVT ppx- Plavix 75mg PO Daily, ASA 81mg PO Daily  -RLE NWB SLC univalved   -Pain management prN  -Pending [place]       
Patient is a 84y old  Female who presents with a chief complaint of  right ankle injury  Patient s/p Reconstruction Achilles Tendon with Tendon Transfer POD#1    Patient comfortable  No complaints    T(C): 36.8 (02-08-25 @ 04:33), Max: 36.8 (02-07-25 @ 15:15)  HR: 74 (02-08-25 @ 04:33) (67 - 86)  BP: 122/55 (02-08-25 @ 04:33) (106/53 - 144/66)  RR: 18 (02-08-25 @ 04:33) (14 - 32)  SpO2: 98% (02-08-25 @ 04:33) (96% - 100%)    PHYSICAL EXAM:  NAD, Alert  [Right  ] L/E in SLC, moving digits; sensation grossly intact                         8.8    8.81  )-----------( 559      ( 08 Feb 2025 06:03 )             27.3     02-08    143  |  109[H]  |  23  ----------------------------<  107[H]  4.1   |  25  |  1.20    Ca    8.9      08 Feb 2025 06:03

## 2025-02-10 NOTE — DISCHARGE NOTE NURSING/CASE MANAGEMENT/SOCIAL WORK - NSDCVIVACCINE_GEN_ALL_CORE_FT
Tdap; 03-Nov-2023 12:00; Chad Heller (RN); [employer]; [id number] (Exp. Date: 20-Jul-2025); IntraMuscular; Deltoid Right.; 0.5 milliLiter(s); VIS (VIS Published: 09-May-2013, VIS Presented: 03-Nov-2023);

## 2025-02-10 NOTE — DISCHARGE NOTE NURSING/CASE MANAGEMENT/SOCIAL WORK - PATIENT PORTAL LINK FT
You can access the FollowMyHealth Patient Portal offered by [facility] by registering at the following website: [email or web address]. By joining [facility]’s FollowMyHealth portal, you will also be able to view your health information using other applications (apps) compatible with our system.

## 2025-02-17 ENCOUNTER — NON-APPOINTMENT (OUTPATIENT)
Age: 85
End: 2025-02-17

## 2025-02-18 ENCOUNTER — APPOINTMENT (OUTPATIENT)
Dept: ORTHOPEDIC SURGERY | Facility: CLINIC | Age: 85
End: 2025-02-18
Payer: MEDICARE

## 2025-02-18 DIAGNOSIS — Z98.890 OTHER SPECIFIED POSTPROCEDURAL STATES: ICD-10-CM

## 2025-02-18 DIAGNOSIS — Z92.29 PERSONAL HISTORY OF OTHER DRUG THERAPY: ICD-10-CM

## 2025-02-18 DIAGNOSIS — S92.041S: ICD-10-CM

## 2025-02-18 DIAGNOSIS — S86.011S STRAIN OF RIGHT ACHILLES TENDON, SEQUELA: ICD-10-CM

## 2025-02-18 PROCEDURE — 99024 POSTOP FOLLOW-UP VISIT: CPT

## 2025-02-18 RX ORDER — WHEELCHAIR
EACH MISCELLANEOUS
Qty: 1 | Refills: 0 | Status: ACTIVE | COMMUNITY
Start: 2025-02-18 | End: 1900-01-01

## 2025-03-03 ENCOUNTER — APPOINTMENT (OUTPATIENT)
Dept: HEMATOLOGY ONCOLOGY | Facility: CLINIC | Age: 85
End: 2025-03-03

## 2025-03-04 ENCOUNTER — APPOINTMENT (OUTPATIENT)
Dept: ORTHOPEDIC SURGERY | Facility: CLINIC | Age: 85
End: 2025-03-04
Payer: MEDICARE

## 2025-03-04 DIAGNOSIS — S92.041S: ICD-10-CM

## 2025-03-04 DIAGNOSIS — Z98.890 OTHER SPECIFIED POSTPROCEDURAL STATES: ICD-10-CM

## 2025-03-04 DIAGNOSIS — Z92.29 PERSONAL HISTORY OF OTHER DRUG THERAPY: ICD-10-CM

## 2025-03-04 DIAGNOSIS — S86.011S STRAIN OF RIGHT ACHILLES TENDON, SEQUELA: ICD-10-CM

## 2025-03-04 PROCEDURE — 99024 POSTOP FOLLOW-UP VISIT: CPT

## 2025-03-25 ENCOUNTER — APPOINTMENT (OUTPATIENT)
Dept: ORTHOPEDIC SURGERY | Facility: CLINIC | Age: 85
End: 2025-03-25
Payer: MEDICARE

## 2025-03-25 DIAGNOSIS — Z98.890 OTHER SPECIFIED POSTPROCEDURAL STATES: ICD-10-CM

## 2025-03-25 DIAGNOSIS — Z92.29 PERSONAL HISTORY OF OTHER DRUG THERAPY: ICD-10-CM

## 2025-03-25 DIAGNOSIS — S92.041S: ICD-10-CM

## 2025-03-25 DIAGNOSIS — S86.011S STRAIN OF RIGHT ACHILLES TENDON, SEQUELA: ICD-10-CM

## 2025-03-25 PROCEDURE — 99024 POSTOP FOLLOW-UP VISIT: CPT

## 2025-04-14 ENCOUNTER — RX RENEWAL (OUTPATIENT)
Age: 85
End: 2025-04-14

## 2025-04-15 ENCOUNTER — APPOINTMENT (OUTPATIENT)
Dept: ORTHOPEDIC SURGERY | Facility: CLINIC | Age: 85
End: 2025-04-15
Payer: MEDICARE

## 2025-04-15 DIAGNOSIS — I99.8 OTHER DISORDER OF CIRCULATORY SYSTEM: ICD-10-CM

## 2025-04-15 DIAGNOSIS — S86.011S STRAIN OF RIGHT ACHILLES TENDON, SEQUELA: ICD-10-CM

## 2025-04-15 DIAGNOSIS — S92.041S: ICD-10-CM

## 2025-04-15 PROCEDURE — 99024 POSTOP FOLLOW-UP VISIT: CPT

## 2025-05-27 ENCOUNTER — LABORATORY RESULT (OUTPATIENT)
Age: 85
End: 2025-05-27

## 2025-06-10 ENCOUNTER — APPOINTMENT (OUTPATIENT)
Dept: ORTHOPEDIC SURGERY | Facility: CLINIC | Age: 85
End: 2025-06-10
Payer: MEDICARE

## 2025-06-10 PROCEDURE — 73630 X-RAY EXAM OF FOOT: CPT | Mod: RT

## 2025-06-10 PROCEDURE — 99213 OFFICE O/P EST LOW 20 MIN: CPT

## 2025-06-15 ENCOUNTER — OUTPATIENT (OUTPATIENT)
Dept: OUTPATIENT SERVICES | Facility: HOSPITAL | Age: 85
LOS: 1 days | Discharge: HOME | End: 2025-06-15

## 2025-06-15 DIAGNOSIS — Z90.49 ACQUIRED ABSENCE OF OTHER SPECIFIED PARTS OF DIGESTIVE TRACT: Chronic | ICD-10-CM

## 2025-06-15 DIAGNOSIS — Q35.9 CLEFT PALATE, UNSPECIFIED: Chronic | ICD-10-CM

## 2025-06-15 DIAGNOSIS — Z98.890 OTHER SPECIFIED POSTPROCEDURAL STATES: Chronic | ICD-10-CM

## 2025-06-15 DIAGNOSIS — Z90.11 ACQUIRED ABSENCE OF RIGHT BREAST AND NIPPLE: Chronic | ICD-10-CM

## 2025-06-15 DIAGNOSIS — Z98.41 CATARACT EXTRACTION STATUS, RIGHT EYE: Chronic | ICD-10-CM

## 2025-06-15 DIAGNOSIS — Z96.642 PRESENCE OF LEFT ARTIFICIAL HIP JOINT: Chronic | ICD-10-CM

## 2025-06-15 DIAGNOSIS — Z98.82 BREAST IMPLANT STATUS: Chronic | ICD-10-CM

## 2025-06-15 DIAGNOSIS — D64.9 ANEMIA, UNSPECIFIED: ICD-10-CM

## 2025-06-15 DIAGNOSIS — Z98.42 CATARACT EXTRACTION STATUS, LEFT EYE: Chronic | ICD-10-CM

## 2025-06-17 ENCOUNTER — APPOINTMENT (OUTPATIENT)
Dept: HEMATOLOGY ONCOLOGY | Facility: CLINIC | Age: 85
End: 2025-06-17
Payer: MEDICARE

## 2025-06-17 VITALS
HEART RATE: 68 BPM | DIASTOLIC BLOOD PRESSURE: 67 MMHG | RESPIRATION RATE: 16 BRPM | TEMPERATURE: 97.4 F | WEIGHT: 125.22 LBS | HEIGHT: 64.5 IN | SYSTOLIC BLOOD PRESSURE: 151 MMHG | OXYGEN SATURATION: 99 % | BODY MASS INDEX: 21.12 KG/M2

## 2025-06-17 PROCEDURE — 99214 OFFICE O/P EST MOD 30 MIN: CPT

## 2025-06-17 PROCEDURE — G2211 COMPLEX E/M VISIT ADD ON: CPT

## 2025-07-03 ENCOUNTER — NON-APPOINTMENT (OUTPATIENT)
Age: 85
End: 2025-07-03

## 2025-07-08 ENCOUNTER — APPOINTMENT (OUTPATIENT)
Dept: ORTHOPEDIC SURGERY | Facility: CLINIC | Age: 85
End: 2025-07-08
Payer: MEDICARE

## 2025-07-08 PROCEDURE — 99213 OFFICE O/P EST LOW 20 MIN: CPT

## 2025-07-08 PROCEDURE — 73502 X-RAY EXAM HIP UNI 2-3 VIEWS: CPT | Mod: LT

## 2025-07-08 RX ORDER — SILVER SULFADIAZINE 10 MG/G
1 CREAM TOPICAL TWICE DAILY
Qty: 1 | Refills: 0 | Status: ACTIVE | COMMUNITY
Start: 2025-07-08 | End: 1900-01-01

## 2025-07-17 ENCOUNTER — RX RENEWAL (OUTPATIENT)
Age: 85
End: 2025-07-17

## 2025-07-22 ENCOUNTER — APPOINTMENT (OUTPATIENT)
Dept: ORTHOPEDIC SURGERY | Facility: CLINIC | Age: 85
End: 2025-07-22
Payer: MEDICARE

## 2025-07-22 DIAGNOSIS — T25.112A: ICD-10-CM

## 2025-07-22 PROCEDURE — 99213 OFFICE O/P EST LOW 20 MIN: CPT

## 2025-07-28 ENCOUNTER — LABORATORY RESULT (OUTPATIENT)
Age: 85
End: 2025-07-28

## 2025-09-02 ENCOUNTER — LABORATORY RESULT (OUTPATIENT)
Age: 85
End: 2025-09-02

## 2025-09-02 DIAGNOSIS — N28.9 DISORDER OF KIDNEY AND URETER, UNSPECIFIED: ICD-10-CM

## 2025-09-08 LAB
ALBUMIN SERPL ELPH-MCNC: 4.1 G/DL
ALP BLD-CCNC: 81 U/L
ALT SERPL-CCNC: 19 U/L
ANION GAP SERPL CALC-SCNC: 12 MMOL/L
AST SERPL-CCNC: 20 U/L
BILIRUB SERPL-MCNC: 0.2 MG/DL
BUN SERPL-MCNC: 43 MG/DL
CALCIUM SERPL-MCNC: 8.6 MG/DL
CHLORIDE SERPL-SCNC: 109 MMOL/L
CO2 SERPL-SCNC: 22 MMOL/L
CREAT SERPL-MCNC: 1.27 MG/DL
EGFRCR SERPLBLD CKD-EPI 2021: 41 ML/MIN/1.73M2
GLUCOSE SERPL-MCNC: 108 MG/DL
POTASSIUM SERPL-SCNC: 5.2 MMOL/L
PROT SERPL-MCNC: 6.3 G/DL
SODIUM SERPL-SCNC: 143 MMOL/L

## (undated) DEVICE — WARMING BLANKET UPPER ADULT

## (undated) DEVICE — GLV 7.5 PROTEXIS (WHITE)

## (undated) DEVICE — DRAPE U (CLEAR) 47 X 51" NON STERILE

## (undated) DEVICE — DRAPE IOBAN 23" X 23"

## (undated) DEVICE — WOUND IRR IRRISEPT W 0.5 CHG

## (undated) DEVICE — NDL SPINAL 18G X 3.5" (PINK)

## (undated) DEVICE — SOL IRR BAG NS 0.9% 3000ML

## (undated) DEVICE — DRAPE XL SHEET 77X98"

## (undated) DEVICE — HOOD FLYTE STRYKER HELMET SHIELD

## (undated) DEVICE — VENODYNE/SCD SLEEVE CALF MEDIUM

## (undated) DEVICE — DRSG STERISTRIPS 0.5 X 4"

## (undated) DEVICE — DRAPE 3/4 SHEET W REINFORCEMENT 56X77"

## (undated) DEVICE — DRAPE C ARM UNIVERSAL

## (undated) DEVICE — DRAPE C ARM 41X140"

## (undated) DEVICE — STAPLER SKIN VISI-STAT 35 WIDE

## (undated) DEVICE — GLV 7 PROTEXIS (WHITE)

## (undated) DEVICE — SOL IRR POUR H2O 250ML

## (undated) DEVICE — GOWN TRIMAX LG

## (undated) DEVICE — TOURNIQUET CUFF 34" DUAL PORT W PLC

## (undated) DEVICE — GLV 8.5 PROTEXIS (WHITE)

## (undated) DEVICE — SUT RETRIEVER S&N LAVENDER

## (undated) DEVICE — SUT SURGIPRO 3-0 18" P-12

## (undated) DEVICE — DRAPE IOBAN 13" X 13"

## (undated) DEVICE — DRSG WEBRIL 6"

## (undated) DEVICE — SUT POLYSORB 3-0 27" GS-11 UNDYED

## (undated) DEVICE — GLV 8 PROTEXIS (WHITE)

## (undated) DEVICE — DRSG STOCKINETTE TUBULAR COTTON 2PLY 6X72"

## (undated) DEVICE — DRSG DERMABOND PRINEO 60CM

## (undated) DEVICE — Device

## (undated) DEVICE — POSITIONER FOAM EGG CRATE ULNAR 2PCS (PINK)

## (undated) DEVICE — DRILL BIT SYNTHES ORTHO QC 2.5X110MM

## (undated) DEVICE — SUT POLYSORB 0 36" GS-24 UNDYED

## (undated) DEVICE — DRSG WEBRIL 4"

## (undated) DEVICE — POSITIONER CUSHION INSERT PRONE VIEW LG

## (undated) DEVICE — SUT STRATAFIX SPIRAL MONOCRYL PLUS 4-0 14CM PS-2 UNDYED

## (undated) DEVICE — SOL IRR POUR H2O 1500ML

## (undated) DEVICE — DRSG ACE BANDAGE 4" NS

## (undated) DEVICE — SUT POLYSORB 2-0 30" GS-21 UNDYED

## (undated) DEVICE — SUT QUILL PDO 0 36CM 36MM

## (undated) DEVICE — SYR LUER LOK 50CC

## (undated) DEVICE — POSITIONER FOAM HEADREST (PINK)

## (undated) DEVICE — STRYKER PULSE LAVAGE WITH HIGH FLOW TIP

## (undated) DEVICE — ELCTR AQUAMANTYS BIPOLAR SEALER 6.0

## (undated) DEVICE — DRSG COBAN 6"

## (undated) DEVICE — DRAPE 1/2 SHEET 40X57"

## (undated) DEVICE — SOL IRR POUR NS 0.9% 500ML

## (undated) DEVICE — SUT MONOSOF 3-0 18" C-14

## (undated) DEVICE — HANDPIECE INTERPULSE W MULTI TIP

## (undated) DEVICE — DRSG STOCKINETTE IMPERVIOUS LG 9 X 48"

## (undated) DEVICE — DRAPE TOWEL BLUE 17" X 24"

## (undated) DEVICE — MARKING PEN W RULER

## (undated) DEVICE — SUT POLYSORB 4-0 P-12 UNDYED

## (undated) DEVICE — SUT VICRYL PLUS 1 27" CP UNDYED

## (undated) DEVICE — SPECIMEN CONTAINER 100ML

## (undated) DEVICE — POSITIONER POSITIONING ROLL  5X17"

## (undated) DEVICE — MEDICATION LABELS W MARKER

## (undated) DEVICE — PACK TOTAL HIP

## (undated) DEVICE — SUT POLYSORB 2-0 30" V-20 UNDYED

## (undated) DEVICE — DRILL BIT SYNTHES ORTHO QC 3.5X110MM

## (undated) DEVICE — SUT STRATAFIX SPIRAL MONOCRYL PLUS 3-0 30CM PS-2 UNDYED

## (undated) DEVICE — DRAPE SUPINE ESYSUIT

## (undated) DEVICE — SUT POLYSORB 3-0 30" P-12 UNDYED

## (undated) DEVICE — SUT ETHIBOND 5 30" LR

## (undated) DEVICE — ARTHREX KIT TENODESIS GRAFT SIZING FOOT & ANKLE

## (undated) DEVICE — SAW BLADE STRYKER SAGITTAL AGGRESSIVE 25X86.5X1.32MM

## (undated) DEVICE — VISITEC 4X4

## (undated) DEVICE — SUCTION YANKAUER NO CONTROL VENT

## (undated) DEVICE — BLADE SCALPEL SAFETYLOCK #10

## (undated) DEVICE — VESSEL LOOP MAXI-RED  0.120" X 16"

## (undated) DEVICE — BLADE SCALPEL SAFETYLOCK #15

## (undated) DEVICE — PACK EXTREMITY

## (undated) DEVICE — DRSG STOCKINETTE IMPERVIOUS MED 8 X 38"

## (undated) DEVICE — DRAPE MAYO STAND 30"

## (undated) DEVICE — ELCTR ROCKER SWITCH PENCIL BLUE 10FT

## (undated) DEVICE — GLV 6.5 PROTEXIS (WHITE)

## (undated) DEVICE — DRSG STOCKINETTE CLOTH 6 X 72"